# Patient Record
Sex: FEMALE | Race: OTHER | Employment: UNEMPLOYED | URBAN - METROPOLITAN AREA
[De-identification: names, ages, dates, MRNs, and addresses within clinical notes are randomized per-mention and may not be internally consistent; named-entity substitution may affect disease eponyms.]

---

## 2018-02-14 ENCOUNTER — OFFICE VISIT (OUTPATIENT)
Dept: FAMILY MEDICINE CLINIC | Age: 38
End: 2018-02-14

## 2018-02-14 VITALS
TEMPERATURE: 98 F | HEIGHT: 60 IN | HEART RATE: 80 BPM | BODY MASS INDEX: 49.87 KG/M2 | SYSTOLIC BLOOD PRESSURE: 122 MMHG | WEIGHT: 254 LBS | DIASTOLIC BLOOD PRESSURE: 67 MMHG

## 2018-02-14 DIAGNOSIS — R07.9 CHEST PAIN, UNSPECIFIED TYPE: ICD-10-CM

## 2018-02-14 DIAGNOSIS — Z23 ENCOUNTER FOR IMMUNIZATION: Primary | ICD-10-CM

## 2018-02-14 DIAGNOSIS — E78.5 HYPERLIPIDEMIA, UNSPECIFIED HYPERLIPIDEMIA TYPE: ICD-10-CM

## 2018-02-14 DIAGNOSIS — R06.00 NOCTURNAL DYSPNEA: ICD-10-CM

## 2018-02-14 NOTE — MR AVS SNAPSHOT
[facility] 
 
 
 222 [facility] 
[phone] Patient: Rebecca Plunkett MRN: [id number] AAR:9/90/6618 Visit Information Date & Time Provider Department Dept. Phone Encounter #  
 2/14/2018 10:45 AM Teddy Blanco  Formerly [facility], [address] [phone] [id number] Follow-up Instructions Return in about 3 months (around 5/14/2018), or if symptoms worsen or fail to improve, for CPE. Upcoming Health Maintenance Date Due DTaP/Tdap/Td series (1 - Tdap) 3/21/1999 PAP AKA CERVICAL CYTOLOGY 3/21/1999 Influenza Age 5 to Adult 8/1/2017 Allergies as of 2/14/2018  Review Complete On: 2/14/2018 By: Surjit Swift No Known Allergies Current Immunizations  Never Reviewed Name Date Influenza Vaccine (Quad) PF  Incomplete Tdap  Incomplete Not reviewed this visit You Were Diagnosed With   
  
 Codes Comments Encounter for immunization    -  Primary ICD-10-CM: H41 ICD-9-CM: V03.89 Chest pain, unspecified type     ICD-10-CM: R07.9 ICD-9-CM: 786.50 Hyperlipidemia, unspecified hyperlipidemia type     ICD-10-CM: E78.5 ICD-9-CM: 272.4 Nocturnal dyspnea     ICD-10-CM: R06.00 
ICD-9-CM: 786.02 Vitals BP Pulse Temp Height(growth percentile) Weight(growth percentile) BMI  
 122/67 (BP 1 Location: Right arm, BP Patient Position: Sitting) 80 98 °F (36.7 °C) (Oral) 5' (1.524 m) 254 lb (115.2 kg) 49.61 kg/m2 OB Status Smoking Status Having regular periods Never Smoker Vitals History BMI and BSA Data Body Mass Index Body Surface Area  
 49.61 kg/m 2 2.21 m 2 Preferred Pharmacy Pharmacy Name Phone [facility] [address] [place], [place] [address]Vance Konradhagen [address] [place] [address] 1500 [facility] [phone] Your Updated Medication List  
  
   
This list is accurate as of: 2/14/18 12:05 PM.  Always use your most recent med list.  
  
  
  
  
 FENOFIBRATE PO Take  by mouth. We Performed the Following AMB POC RAPID INFLUENZA TEST [51296 CPT(R)] INFLUENZA VIRUS VAC QUAD,SPLIT,PRESV FREE SYRINGE IM [id number] CPT(R)] REFERRAL TO CARDIOLOGY [OKF96 Custom] SLEEP MEDICINE REFERRAL [UBH620 Custom] Comments:  
 Orders: 
Sleep Medicine Consult - Schedule patient for a sleep specialist consult. If appropriate, schedule patient for sleep study(s). Initiate treatment if needed. Forward correspondance to my office. TETANUS, DIPHTHERIA TOXOIDS AND ACELLULAR PERTUSSIS VACCINE (TDAP), IN INDIVIDS. >=7, IM [id number] CPT(R)] Follow-up Instructions Return in about 3 months (around 5/14/2018), or if symptoms worsen or fail to improve, for CPE. Referral Information Referral ID Referred By Referred To  
  
 [id number] JEANNA, [address] [facility], [address] Phone: [phone] Fax: [phone] Visits Status Start Date End Date 1 New Request 2/14/18 2/14/19 If your referral has a status of pending review or denied, additional information will be sent to support the outcome of this decision. Referral ID Referred By Referred To  
 [id number] [facility]  
   [address] [facility] [address] [facility] Phone: [phone] Visits Status Start Date End Date 1 New Request 2/14/18 2/14/19 If your referral has a status of pending review or denied, additional information will be sent to support the outcome of this decision. Patient Instructions Chest Pain: Care Instructions Your Care Instructions There are many things that can cause chest pain. Some are not serious and will get better on their own in a few days. But some kinds of chest pain need more testing and treatment.  Your doctor may have recommended a follow-up visit in the next 8 to 12 hours. If you are not getting better, you may need more tests or treatment. Even though your doctor has released you, you still need to watch for any problems. The doctor carefully checked you, but sometimes problems can develop later. If you have new symptoms or if your symptoms do not get better, get medical care right away. If you have worse or different chest pain or pressure that lasts more than 5 minutes or you passed out (lost consciousness), call 911 or seek other emergency help right away. A medical visit is only one step in your treatment. Even if you feel better, you still need to do what your doctor recommends, such as going to all suggested follow-up appointments and taking medicines exactly as directed. This will help you recover and help prevent future problems. How can you care for yourself at home? · Rest until you feel better. · Take your medicine exactly as prescribed. Call your doctor if you think you are having a problem with your medicine. · Do not drive after taking a prescription pain medicine. When should you call for help? Call 911 if: 
? · You passed out (lost consciousness). ? · You have severe difficulty breathing. ? · You have symptoms of a heart attack. These may include: ¨ Chest pain or pressure, or a strange feeling in your chest. 
¨ Sweating. ¨ Shortness of breath. ¨ Nausea or vomiting. ¨ Pain, pressure, or a strange feeling in your back, neck, jaw, or upper belly or in one or both shoulders or arms. ¨ Lightheadedness or sudden weakness. ¨ A fast or irregular heartbeat. After you call 911, the  may tell you to chew 1 adult-strength or 2 to 4 low-dose aspirin. Wait for an ambulance. Do not try to drive yourself. ?Call your doctor today if: 
? · You have any trouble breathing. ? · Your chest pain gets worse. ? · You are dizzy or lightheaded, or you feel like you may faint. ? · You are not getting better as expected. ? · You are having new or different chest pain. Where can you learn more? Go to http://aleksey-michael.info/. Enter A120 in the search box to learn more about \"Chest Pain: Care Instructions. \" Current as of: March 20, 2017 Content Version: 11.4 © 9868-0303 MICMALI. Care instructions adapted under license by [employer] (which disclaims liability or warranty for this information). If you have questions about a medical condition or this instruction, always ask your healthcare professional. [address] any warranty or liability for your use of this information. Introducing [facility] & HEALTH SERVICES! Elin Zelaya introduces [employer] patient portal. Now you can access parts of your medical record, email your doctor's office, and request medication refills online. 1. In your internet browser, go to [email or web address] 2. Click on the First Time User? Click Here link in the Sign In box. You will see the New Member Sign Up page. 3. Enter your [employer] Access Code exactly as it appears below. You will not need to use this code after youve completed the sign-up process. If you do not sign up before the expiration date, you must request a new code. · [employer] Access Code: [email or web address] Expires: 5/15/2018 12:05 PM 
 
4. Enter the last four digits of your Social Security Number (xxxx) and Date of Birth (mm/dd/yyyy) as indicated and click Submit. You will be taken to the next sign-up page. 5. Create a Reppifyt ID. This will be your [employer] login ID and cannot be changed, so think of one that is secure and easy to remember. 6. Create a Reppifyt password. You can change your password at any time. 7. Enter your Password Reset Question and Answer. This can be used at a later time if you forget your password. 8. Enter your e-mail address.  You will receive e-mail notification when new information is available in Newsummitbio. 9. Click Sign Up. You can now view and download portions of your medical record. 10. Click the Download Summary menu link to download a portable copy of your medical information. If you have questions, please visit the Frequently Asked Questions section of the Newsummitbio website. Remember, Newsummitbio is NOT to be used for urgent needs. For medical emergencies, dial 911. Now available from your iPhone and Android! Please provide this summary of care documentation to your next provider. Your primary care clinician is listed as Abran Rubin. If you have any questions after today's visit, please call [phone].

## 2018-02-14 NOTE — PATIENT INSTRUCTIONS
Chest Pain: Care Instructions  Your Care Instructions    There are many things that can cause chest pain. Some are not serious and will get better on their own in a few days. But some kinds of chest pain need more testing and treatment. Your doctor may have recommended a follow-up visit in the next 8 to 12 hours. If you are not getting better, you may need more tests or treatment. Even though your doctor has released you, you still need to watch for any problems. The doctor carefully checked you, but sometimes problems can develop later. If you have new symptoms or if your symptoms do not get better, get medical care right away. If you have worse or different chest pain or pressure that lasts more than 5 minutes or you passed out (lost consciousness), call 911 or seek other emergency help right away. A medical visit is only one step in your treatment. Even if you feel better, you still need to do what your doctor recommends, such as going to all suggested follow-up appointments and taking medicines exactly as directed. This will help you recover and help prevent future problems. How can you care for yourself at home? · Rest until you feel better. · Take your medicine exactly as prescribed. Call your doctor if you think you are having a problem with your medicine. · Do not drive after taking a prescription pain medicine. When should you call for help? Call 911 if:  ? · You passed out (lost consciousness). ? · You have severe difficulty breathing. ? · You have symptoms of a heart attack. These may include:  ¨ Chest pain or pressure, or a strange feeling in your chest.  ¨ Sweating. ¨ Shortness of breath. ¨ Nausea or vomiting. ¨ Pain, pressure, or a strange feeling in your back, neck, jaw, or upper belly or in one or both shoulders or arms. ¨ Lightheadedness or sudden weakness. ¨ A fast or irregular heartbeat.   After you call 911, the  may tell you to chew 1 adult-strength or 2 to 4 low-dose aspirin. Wait for an ambulance. Do not try to drive yourself. ?Call your doctor today if:  ? · You have any trouble breathing. ? · Your chest pain gets worse. ? · You are dizzy or lightheaded, or you feel like you may faint. ? · You are not getting better as expected. ? · You are having new or different chest pain. Where can you learn more? Go to http://aleksey-michael.info/. Enter A120 in the search box to learn more about \"Chest Pain: Care Instructions. \"  Current as of: March 20, 2017  Content Version: 11.4  © 3409-3534 ReliOn. Care instructions adapted under license by [employer] (which disclaims liability or warranty for this information). If you have questions about a medical condition or this instruction, always ask your healthcare professional. [address] any warranty or liability for your use of this information.

## 2018-02-14 NOTE — PROGRESS NOTES
Assessment/Plan:     Diagnoses and all orders for this visit:    1. Encounter for immunization  -     TETANUS, DIPHTHERIA TOXOIDS AND ACELLULAR PERTUSSIS VACCINE (TDAP), IN INDIVIDS. >=7, IM  -     INFLUENZA VIRUS VACCINE QUADRIVALENT, PRESERVATIVE FREE SYRINGE (82746)    2. Chest pain, unspecified type  -     REFERRAL TO CARDIOLOGY  Atypical in nature. Will refer to cardiology for additional evaluation. Patient is poor historian so history is unclear. 3. Hyperlipidemia, unspecified hyperlipidemia type   -On Fenofibrate from previous provider. Will get records and schedule CPE with bloodwork to determine plan    4. Nocturnal dyspnea  -     SLEEP MEDICINE REFERRAL  Will evaluate for sleep apnea. The patient was seen in conjunction with the NP and plan of care was agreed upon by me. Yu Rodriguez, RIVAS    I have reviewed/discussed the above normal BMI with the patient. I have recommended the following interventions: dietary management education, guidance, and counseling, encourage exercise, monitor weight and prescribed dietary intake. Given written instructions as well. Follow-up Disposition:  Return in about 3 months (around 5/14/2018), or if symptoms worsen or fail to improve, for CPE. Discussed expected course/resolution/complications of diagnosis in detail with patient.    Medication risks/benefits/costs/interactions/alternatives discussed with patient.    Pt was given after visit summary which includes diagnoses, current medications & vitals. Pt expressed understanding with the diagnosis and plan        Subjective:      Zehra Leigh is a 42 y.o. female who presents for had concerns including Chest Pain and Referral Follow Up. Chest Pain  Patient complains of chest pain. Onset was 2 months ago, with worsening course since that time.   The patient admits to chest discomfort that is intermittent, with radiation to left arm, rated as a scale of 5/10 in intensity that is tightness in nature. Associated symptoms are chest pain, chest pressure/discomfort, palpitations, near-syncope, SOB, dizziness, PND. Aggravating factors are large meals, walking or exertion. Alleviating factors are: nothing. Patient's cardiac risk factors are family history, dyslipidemia, sedentary life style, hypertension. Previous cardiac testing includes: Electrocardiogram (EKG). Pt states 2-3 years ago had a heart attack. Went to [facility] ER. Having and EGD on 2/23 and wants Cardiology referral.           No Known Allergies    ROS:   Review of Systems   Constitutional: Negative for chills, fever and weight loss. Respiratory: Positive for shortness of breath. Negative for cough. Cardiovascular: Positive for chest pain, palpitations and PND. Negative for leg swelling. Gastrointestinal: Positive for nausea. Negative for abdominal pain. Neurological: Positive for dizziness. Objective:     Visit Vitals    /67 (BP 1 Location: Right arm, BP Patient Position: Sitting)    Pulse 80    Temp 98 °F (36.7 °C) (Oral)    Ht 5' (1.524 m)    Wt 254 lb (115.2 kg)    BMI 49.61 kg/m2       Vitals and Nurse Documentation reviewed. Physical Exam   Constitutional: She is well-developed, well-nourished, and in no distress. No distress. Cardiovascular: Normal rate, regular rhythm and normal heart sounds. Exam reveals no gallop and no friction rub. No murmur heard. Pulmonary/Chest: Effort normal and breath sounds normal. No respiratory distress. She has no wheezes. She has no rales. Musculoskeletal: She exhibits no edema. Skin: She is not diaphoretic.

## 2018-02-14 NOTE — PROGRESS NOTES
Chief Complaint   Patient presents with    Chest Pain     x1 week    Referral Follow Up       1. Have you been to the ER, urgent care clinic since your last visit? Hospitalized since your last visit? No    2. Have you seen or consulted any other health care providers outside of the [address] [place] [address] since your last visit? Include any pap smears or colon screening. 2/2/17 Victoriano Wright.

## 2018-02-21 PROBLEM — E66.01 OBESITY, MORBID (HCC): Status: ACTIVE | Noted: 2018-02-21

## 2018-02-22 ENCOUNTER — OFFICE VISIT (OUTPATIENT)
Dept: CARDIOLOGY CLINIC | Age: 38
End: 2018-02-22

## 2018-02-22 VITALS
OXYGEN SATURATION: 98 % | BODY MASS INDEX: 49.98 KG/M2 | DIASTOLIC BLOOD PRESSURE: 80 MMHG | WEIGHT: 254.6 LBS | HEART RATE: 84 BPM | SYSTOLIC BLOOD PRESSURE: 132 MMHG | HEIGHT: 60 IN | RESPIRATION RATE: 20 BRPM

## 2018-02-22 DIAGNOSIS — Z82.49 FAMILY HISTORY OF EARLY CAD: ICD-10-CM

## 2018-02-22 DIAGNOSIS — R07.9 CHEST PAIN, UNSPECIFIED TYPE: ICD-10-CM

## 2018-02-22 DIAGNOSIS — I20.0 UNSTABLE ANGINA (HCC): Primary | ICD-10-CM

## 2018-02-22 DIAGNOSIS — E66.01 OBESITY, MORBID (HCC): ICD-10-CM

## 2018-02-22 RX ORDER — FENOFIBRATE 134 MG/1
CAPSULE ORAL
COMMUNITY
End: 2018-07-30 | Stop reason: SDUPTHER

## 2018-02-22 NOTE — PROGRESS NOTES
[place]ER[place]: Elaine  [phone]    History of Present Illness:   Ms. Mary Gimenez is a 41 yo Farsi speaking female with morbid obesity, family history of early coronary artery disease, depression referred by Yu Rodriguez NP for cardiac evaluation. She is here due to recent chest pains. They have been occurring for the last few months a few times a week where she will get substernal chest pain that will sometimes radiate to her left arm, can last 20-30 minutes at a time, no clear triggers can happen with rest or exertion. She was a very poor historian, daughter is present to help translate. She apparently is going to have a GI EGD tomorrow. With regard to her prior cardiac history, she says she may have had an event with her heart two to three years ago when she went to [facility], but she is unclear about the details. She does not think she has had a stress test or heart catheterization. She is compensated on exam with clear lungs. Blood pressure was 132/80 with a heart rate of 84, but her EKG was normal sinus rhythm, heart rate of 73, nonspecific ST-T wave abnormality. Soc hx. No tobacco  Fam hx. Brother CAD, 36. Assessment and Plan:   1. Unstable angina. Chest pain with typical and atypical features and she does not have much in terms of risk factors, but does have significant family history. Will proceed with a treadmill stress echocardiogram for further evaluation. I think she can have the EGD first.  If she wants to delay the EGD after the stress test that is okay as well. I gave her a note that she can proceed with an EGD tomorrow if she would like. 2. Family history of early coronary artery disease. 3. Morbid obesity. She  has a past medical history of Depression and Dizziness. All other systems negative except as above.      PE  Vitals:    02/22/18 1033   BP: 132/80   Pulse: 84   Resp: 20   SpO2: 98%   Height: 5' (1.524 m)    There is no height or weight on file to calculate BMI. General appearance - alert, morbid obesity, and in no distress  Mental status - affect appropriate to mood  Eyes - sclera anicteric, moist mucous membranes  Neck - supple, no JVD  Chest - clear to auscultation, no wheezes, rales or rhonchi  Heart - normal rate, regular rhythm, normal S1, S2, no murmurs, rubs, clicks or gallops  Abdomen - soft, nontender, nondistended, no masses or organomegaly  Neurological - no focal deficit  Extremities - peripheral pulses normal, no pedal edema      Recent Labs:  Lab Results   Component Value Date/Time    Cholesterol, total 158 04/02/2015 09:49 AM    HDL Cholesterol 41 04/02/2015 09:49 AM    LDL, calculated 91 04/02/2015 09:49 AM    Triglyceride 131 04/02/2015 09:49 AM     Lab Results   Component Value Date/Time    Creatinine 0.59 04/02/2015 09:49 AM     Lab Results   Component Value Date/Time    BUN 12 04/02/2015 09:49 AM     Lab Results   Component Value Date/Time    Potassium 4.2 04/02/2015 09:49 AM     Lab Results   Component Value Date/Time    Hemoglobin A1c 5.6 04/02/2015 09:49 AM     Lab Results   Component Value Date/Time    HGB 12.9 04/02/2015 09:49 AM     Lab Results   Component Value Date/Time    PLATELET 935 78/14/3559 09:49 AM       Reviewed:  Past Medical History:   Diagnosis Date    Depression     Dizziness      History   Smoking Status    Never Smoker   Smokeless Tobacco    Former User     Comment: hookah      History   Alcohol Use No     No Known Allergies    Current Outpatient Prescriptions   Medication Sig    fenofibrate micronized (LOFIBRA) 134 mg capsule Take  by mouth every morning. No current facility-administered medications for this visit.         Rin Husain MD  [facility] heart and Vascular [place]  [address], [address] [facility] [address]  [address]

## 2018-02-22 NOTE — LETTER
2/22/2018 11:05 AM 
 
Ms. Annie Wheeler [address] [place] [id number] To Whom it may concern, 
 
Ms. Marquise Beaver is a patient of Dr. Silverio Gonzalez. She has been cleared from a cardiac standpoint to proceed with her upcoming EGD. Please contact the office with any questions. Sincerely, Maegan Garcia MD

## 2018-02-22 NOTE — MR AVS SNAPSHOT
[address] [facility] [address] [facility] 
[phone] Patient: Santi Renteria MRN: [id number] TKB:4/64/8503 Visit Information Date & Time Provider Department Dept. Phone Encounter #  
 2/22/2018 10:40 AM Rin Husain MD [facility] [place] [phone] [id number] Your Appointments 5/16/2018  9:00 AM  
SAME DAY with Dasha Jackson  [facility] ([facility]) Appt Note: f/u on chest pain [address] [facility] 404 [facility]  
  
   
 [address] [phone]  
  
    
 5/31/2018  3:00 PM  
Any with Aj Gibson MD  
[address] [place] [address] ([facility]) Appt Note: NP refd by Dr. RAMAN \[facility]\"" [place] nocturnal dyspnea [address] [facility] [address] [phone] Upcoming Health Maintenance Date Due  
 PAP AKA CERVICAL CYTOLOGY 3/21/1999 DTaP/Tdap/Td series (2 - Td) 2/14/2028 Allergies as of 2/22/2018  Review Complete On: 2/22/2018 By: Edilia Delgado No Known Allergies Current Immunizations  Never Reviewed Name Date Influenza Vaccine (Quad) PF 2/14/2018 Tdap 2/14/2018 Not reviewed this visit You Were Diagnosed With   
  
 Codes Comments Unstable angina (HCC)    -  Primary ICD-10-CM: I20.0 ICD-9-CM: 411.1 Chest pain, unspecified type     ICD-10-CM: R07.9 ICD-9-CM: 786.50 Obesity, morbid ([facility] [address]     ICD-10-CM: E66.01 
ICD-9-CM: 278.01 Family history of early CAD     ICD-10-CM: Z82.49 
ICD-9-CM: V17.3 Vitals BP Pulse Resp Height(growth percentile) Weight(growth percentile) SpO2  
 132/80 (BP 1 Location: Left arm) 84 20 5' (1.524 m) 254 lb 9.6 oz (115.5 kg) 98% BMI OB Status Smoking Status 49.72 kg/m2 Having regular periods Never Smoker Vitals History BMI and BSA Data Body Mass Index Body Surface Area 49.72 kg/m 2 2.21 m 2 Preferred Pharmacy Pharmacy Name Phone [facility] [address] [facility], [address] [place] [address],[facility] Floor [phone] Your Updated Medication List  
  
   
This list is accurate as of 2/22/18 11:07 AM.  Always use your most recent med list.  
  
  
  
  
 fenofibrate micronized 134 mg capsule Commonly known as:  LOFIBRA Take  by mouth every morning. We Performed the Following AMB POC EKG ROUTINE W/ 12 LEADS, INTER & REP [94912 CPT(R)] To-Do List   
 02/22/2018 ECHO:  ECHO TTE STRESS EXRCSE COMP W OR WO CONTR Introducing \[facility]\"" & HEALTH SERVICES! Christine Thomason introduces [employer] patient portal. Now you can access parts of your medical record, email your doctor's office, and request medication refills online. 1. In your internet browser, go to [email or web address] [employer] 2. Click on the First Time User? Click Here link in the Sign In box. You will see the New Member Sign Up page. 3. Enter your Universal [employer] Access Code exactly as it appears below. You will not need to use this code after youve completed the sign-up process. If you do not sign up before the expiration date, you must request a new code. · [employer] Access Code: [email or web address] Expires: 5/15/2018 12:05 PM 
 
4. Enter the last four digits of your Social Security Number (xxxx) and Date of Birth (mm/dd/yyyy) as indicated and click Submit. You will be taken to the next sign-up page. 5. Create a Universal Ad ID. This will be your Universal [employer] login ID and cannot be changed, so think of one that is secure and easy to remember. 6. Create a Universal Ad password. You can change your password at any time. 7. Enter your Password Reset Question and Answer. This can be used at a later time if you forget your password. 8. Enter your e-mail address.  You will receive e-mail notification when new information is available in GetPromotd. 9. Click Sign Up. You can now view and download portions of your medical record. 10. Click the Download Summary menu link to download a portable copy of your medical information. If you have questions, please visit the Frequently Asked Questions section of the GetPromotd website. Remember, GetPromotd is NOT to be used for urgent needs. For medical emergencies, dial 911. Now available from your iPhone and Android! Please provide this summary of care documentation to your next provider. Your primary care clinician is listed as Kaylan Alonso. If you have any questions after today's visit, please call [phone].

## 2018-02-28 ENCOUNTER — TELEPHONE (OUTPATIENT)
Dept: FAMILY MEDICINE CLINIC | Age: 38
End: 2018-02-28

## 2018-02-28 NOTE — TELEPHONE ENCOUNTER
Patients daughter, Livier Stephenson is calling, she would like to know if RIVAS Joan has received her mothers results from her stomach US that was done last Friday, she states that she was told by outpatient registration at [facility] that everything would be sent over as soon as possible. After checking the patients HIPPA form the daughter was not on it, I advised her to please update this, she advised that her mom only speaks [place] and a  was contacted via [employer] system (call ID #[id number]), spoke with the patient and she was advised we do not have the information she is requesting, she would like to know when we are going to have it, I advised her I am unsure we do have to wait for [facility] to send us the records but I will send this back for the provider to review. She would like a call back as soon as possible as she needs to know if she needs an endoscopy or not.      Best call back # for patient: [phone]  Patient needs a [employer]

## 2018-03-02 NOTE — TELEPHONE ENCOUNTER
Called patient. Interpretor was called because the patient speaks Farsi.(call ID: [id number]). Name and  verified. Inform patient of pcp's recommendations for US and give patient number to schedule an appointment with GI doctor. Daughter wanted to speak to nurse, but couldn't speak to the daughter due to daughter not being on HIPPA form.

## 2018-03-02 NOTE — TELEPHONE ENCOUNTER
Us shows fatty liver only, which does not cause bloating. She should follow up with gastroenterology.

## 2018-03-06 ENCOUNTER — CLINICAL SUPPORT (OUTPATIENT)
Dept: CARDIOLOGY CLINIC | Age: 38
End: 2018-03-06

## 2018-03-06 DIAGNOSIS — R07.9 CHEST PAIN, UNSPECIFIED TYPE: ICD-10-CM

## 2018-03-06 DIAGNOSIS — Z82.49 FAMILY HISTORY OF EARLY CAD: ICD-10-CM

## 2018-03-06 DIAGNOSIS — E66.01 OBESITY, MORBID (HCC): ICD-10-CM

## 2018-03-06 DIAGNOSIS — I20.0 UNSTABLE ANGINA (HCC): ICD-10-CM

## 2018-03-07 ENCOUNTER — TELEPHONE (OUTPATIENT)
Dept: CARDIOLOGY CLINIC | Age: 38
End: 2018-03-07

## 2018-03-07 NOTE — TELEPHONE ENCOUNTER
----- Message from Frank Hahn MD sent at 3/6/2018  3:55 PM EST -----  Please let pt know stress test was normal. thx    Results letter mailed to patient.

## 2018-03-07 NOTE — LETTER
3/7/2018 10:37 AM 
 
Ms. Annie Wheeler [address] [facility] [address] Dear Madeleine Osler I have reviewed your results and have found the results listed below to be within normal ranges. Stress Echocardiogram 
 
My recommendations are as follows: 
None. Keep up the good work! Please call if you have any questions [phone] . Sincerely, Cristal Gan MD

## 2018-05-16 ENCOUNTER — OFFICE VISIT (OUTPATIENT)
Dept: FAMILY MEDICINE CLINIC | Age: 38
End: 2018-05-16

## 2018-05-16 VITALS
HEART RATE: 80 BPM | RESPIRATION RATE: 18 BRPM | BODY MASS INDEX: 49.48 KG/M2 | DIASTOLIC BLOOD PRESSURE: 71 MMHG | OXYGEN SATURATION: 97 % | TEMPERATURE: 98.5 F | HEIGHT: 60 IN | WEIGHT: 252 LBS | SYSTOLIC BLOOD PRESSURE: 105 MMHG

## 2018-05-16 DIAGNOSIS — E66.01 MORBID OBESITY (HCC): ICD-10-CM

## 2018-05-16 DIAGNOSIS — K21.9 GASTROESOPHAGEAL REFLUX DISEASE WITHOUT ESOPHAGITIS: Primary | ICD-10-CM

## 2018-05-16 DIAGNOSIS — Z12.31 SCREENING MAMMOGRAM, ENCOUNTER FOR: ICD-10-CM

## 2018-05-16 DIAGNOSIS — R42 VERTIGO: ICD-10-CM

## 2018-05-16 DIAGNOSIS — Z13.220 SCREENING, LIPID: ICD-10-CM

## 2018-05-16 DIAGNOSIS — Z13.1 SCREENING FOR DIABETES MELLITUS: ICD-10-CM

## 2018-05-16 NOTE — PROGRESS NOTES
Chief Complaint   Patient presents with    Chest Pain     follow up    Leg Pain     Right and left     Cholesterol Problem     1. Have you been to the ER, urgent care clinic since your last visit? Hospitalized since your last visit? No    2. Have you seen or consulted any other health care providers outside of the [address] [place] [address] since your last visit? Include any pap smears or colon screening.  No

## 2018-05-16 NOTE — PROGRESS NOTES
Assessment/Plan:     Diagnoses and all orders for this visit:    1. Gastroesophageal reflux disease without esophagitis  Uncontrolled. She will continue PPI therapy. She will follow-up with Dr. Ewa Lu for EGD next month. I have strongly encouraged dietary modifications including the reduction of high fat foods. She does eat spicy foods often. We did discuss reduction in these. 2. Screening, lipid  -     LIPID PANEL    3. Screening mammogram, encounter for  -     [facility] MAMMO BI SCREENING INCL CAD; Future    4. Morbid obesity ([address]  -     HEMOGLOBIN A1C WITH EAG  I have recommended weight loss of 5 pounds this month. She will follow-up in 1 month. We did discuss increasing her raw fruits and vegetables as well as lean proteins including beans. She will reduce her fat content including mayonnaise, ranch dressing, and cooking oil. We discussed a goal of 1 tablespoon which is a significant reduction from her previously estimated one fourth of a cup for meal preparation. 5. Screening for diabetes mellitus  -     HEMOGLOBIN A1C WITH EAG    6. Vertigo  -     CBC WITH AUTOMATED DIFF  -     METABOLIC PANEL, COMPREHENSIVE  Unclear etiology. Labs as above. Continue to monitor. Other orders  -     CVD REPORT        Follow-up Disposition:  Return in about 4 weeks (around 6/13/2018) for Follow Up. Discussed expected course/resolution/complications of diagnosis in detail with patient.    Medication risks/benefits/costs/interactions/alternatives discussed with patient.    Pt was given after visit summary which includes diagnoses, current medications & vitals. Pt expressed understanding with the diagnosis and plan          Subjective:      Js Ramsey is a 34 y.o. female who presents for had concerns including Chest Pain; Leg Pain; and Constipation. The patient is accompanied by patient and daughter and translation is assisted with her daughter as the patient is Occitan-speaking only.      Presents with a multitude of concerns. Reports a history of constipation for greater than 5 months. Bowel movements are hard with pain associated and are every 2 days. She is not currently using over-the-counter medications. She has had prior gastroenterology evaluation. She has not made dietary modifications. She has upcoming EGD scheduled with Dr. Sukh Ramírez next month. She denies melena or tarry stool. She continues with indigestion. She is using daily PPI therapy. She would like to know if she should continue. She is interested in losing weight. She is currently not on a formal diet program.  She is not using medications for weight loss. This is her heaviest lifetime weight at 252 pounds. She admits to poor eating habits including a diet heavy in carbohydrates and saturated fat. 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 She does shop for the family. She does not drink juice or soda. She does not eat sweets. She is unable to exercise secondary to pain in the bilateral hips and knees. She has had no formal diet dentition evaluation. She is interested in fasting labs today. She has a history of hypertriglyceridemia. She is currently on [place] without difficulty. She is previously undergone cardiac evaluation for atypical chest pain with negative stress test this year. She continues with occasional chest pains not associated with shortness of breath. She is previously undergone Pap smear testing 1 year ago. She cannot recall the provider name. She reports \"something was removed\". She has never undergone mammogram screening. She reports a history of vertigo for greater than 5 months. She reports it is lightheadedness. Not associated with headache, nausea, vomiting, or visual changes. Reports that it is food associated typically including lemon and vinegar. Current Outpatient Prescriptions   Medication Sig Dispense Refill    pantoprazole (PROTONIX) 40 mg tablet Take 40 mg by mouth daily.  fenofibrate micronized (LOFIBRA) 134 mg capsule Take  by mouth every morning. No Known Allergies    ROS:   Complete review of systems was reviewed with pertinent information listed in HPI. Objective:     Visit Vitals    /71 (BP 1 Location: Left arm, BP Patient Position: Sitting)    Pulse 80    Temp 98.5 °F (36.9 °C) (Oral)    Resp 18    Ht 5' (1.524 m)    Wt 252 lb (114.3 kg)    SpO2 97%    BMI 49.22 kg/m2       Vitals and Nurse Documentation reviewed.      Physical Exam

## 2018-05-16 NOTE — MR AVS SNAPSHOT
[facility] 
 
 
 [address] 
[phone] Patient: Chante Domínguez MRN: [id number] FBT:[birth year] Visit Information Date & Time Provider Department Dept. Phone Encounter #  
 5/16/2018 11:30 AM Leonardo Arora  [facility] [phone] [id number] Follow-up Instructions Return in about 4 weeks (around 6/13/2018) for Follow Up. Your Appointments 5/31/2018  3:00 PM  
Any with Shoshana Renee MD  
[address] ([facility]) Appt Note: NP refd by Dr. Mode Franco nocturnal dyspnea [address] [address] Upcoming Health Maintenance Date Due  
 PAP AKA CERVICAL CYTOLOGY 3/21/1999 Influenza Age 5 to Adult 8/1/2018 DTaP/Tdap/Td series (2 - Td) 2/14/2028 Allergies as of 5/16/2018  Review Complete On: 5/16/2018 By: Charisma Webb LPN No Known Allergies Current Immunizations  Never Reviewed Name Date Influenza Vaccine (Quad) PF 2/14/2018 Tdap 2/14/2018 Not reviewed this visit You Were Diagnosed With   
  
 Codes Comments Screening, lipid    -  Primary ICD-10-CM: G89.309 ICD-9-CM: V77.91 Screening mammogram, encounter for     ICD-10-CM: Z12.31 
ICD-9-CM: V76.12 Morbid obesity ([facility]     ICD-10-CM: E66.01 
ICD-9-CM: 278.01 Screening for diabetes mellitus     ICD-10-CM: Z13.1 ICD-9-CM: V77.1 Vertigo     ICD-10-CM: J76 ICD-9-CM: 780.4 Vitals BP Pulse Temp Resp Height(growth percentile) Weight(growth percentile) 105/71 (BP 1 Location: Left arm, BP Patient Position: Sitting) 80 98.5 °F (36.9 °C) (Oral) 18 5' (1.524 m) 252 lb (114.3 kg) SpO2 BMI OB Status Smoking Status 97% 49.22 kg/m2 Having regular periods Never Smoker Vitals History BMI and BSA Data Body Mass Index Body Surface Area  
 49.22 kg/m 2 2.2 m 2 Preferred Pharmacy Pharmacy Name Phone [facility] [address] [facility], [address] [place] [address],[facility] Floor [phone] Your Updated Medication List  
  
   
This list is accurate as of 5/16/18 12:45 PM.  Always use your most recent med list.  
  
  
  
  
 fenofibrate micronized 134 mg capsule Commonly known as:  LOFIBRA Take  by mouth every morning. We Performed the Following CBC WITH AUTOMATED DIFF [56233 CPT(R)] HEMOGLOBIN A1C WITH EAG [80330 CPT(R)] LIPID PANEL [10329 CPT(R)] METABOLIC PANEL, COMPREHENSIVE [52886 CPT(R)] Follow-up Instructions Return in about 4 weeks (around 6/13/2018) for Follow Up. Introducing [facility] & HEALTH SERVICES! Mary Ellen Vera introduces [employer] patient portal. Now you can access parts of your medical record, email your doctor's office, and request medication refills online. 1. In your internet browser, go to [email or web address] 2. Click on the First Time User? Click Here link in the Sign In box. You will see the New Member Sign Up page. 3. Enter your [employer] Access Code exactly as it appears below. You will not need to use this code after youve completed the sign-up process. If you do not sign up before the expiration date, you must request a new code. · [employer] Access Code: [email or web address] Expires: 8/14/2018 11:30 AM 
 
4. Enter the last four digits of your Social Security Number (xxxx) and Date of Birth (mm/dd/yyyy) as indicated and click Submit. You will be taken to the next sign-up page. 5. Create a [employer] ID. This will be your [employer] login ID and cannot be changed, so think of one that is secure and easy to remember. 6. Create a [employer] password. You can change your password at any time. 7. Enter your Password Reset Question and Answer. This can be used at a later time if you forget your password. 8. Enter your e-mail address. You will receive e-mail notification when new information is available in [address] 9. Click Sign Up. You can now view and download portions of your medical record. 10. Click the Download Summary menu link to download a portable copy of your medical information. If you have questions, please visit the Frequently Asked Questions section of the Mobile [employer] website. Remember, [employer] is NOT to be used for urgent needs. For medical emergencies, dial 911. Now available from your iPhone and Android! Please provide this summary of care documentation to your next provider. Your primary care clinician is listed as Pauline Ambriz. If you have any questions after today's visit, please call [phone].

## 2018-05-17 LAB
ALBUMIN SERPL-MCNC: 4.2 G/DL (ref 3.5–5.5)
ALBUMIN/GLOB SERPL: 1.7 {RATIO} (ref 1.2–2.2)
ALP SERPL-CCNC: 66 IU/L (ref 39–117)
ALT SERPL-CCNC: 16 IU/L (ref 0–32)
AST SERPL-CCNC: 10 IU/L (ref 0–40)
BASOPHILS # BLD AUTO: 0 X10E3/UL (ref 0–0.2)
BASOPHILS NFR BLD AUTO: 0 %
BILIRUB SERPL-MCNC: 0.3 MG/DL (ref 0–1.2)
BUN SERPL-MCNC: 15 MG/DL (ref 6–24)
BUN/CREAT SERPL: 21 (ref 9–23)
CALCIUM SERPL-MCNC: 9.2 MG/DL (ref 8.7–10.2)
CHLORIDE SERPL-SCNC: 106 MMOL/L (ref 96–106)
CHOLEST SERPL-MCNC: 174 MG/DL (ref 100–199)
CO2 SERPL-SCNC: 22 MMOL/L (ref 18–29)
CREAT SERPL-MCNC: 0.7 MG/DL (ref 0.57–1)
EOSINOPHIL # BLD AUTO: 0.1 X10E3/UL (ref 0–0.4)
EOSINOPHIL NFR BLD AUTO: 1 %
ERYTHROCYTE [DISTWIDTH] IN BLOOD BY AUTOMATED COUNT: 13.8 % (ref 12.3–15.4)
EST. AVERAGE GLUCOSE BLD GHB EST-MCNC: 105 MG/DL
GFR SERPLBLD CREATININE-BSD FMLA CKD-EPI: 109 ML/MIN/1.73
GFR SERPLBLD CREATININE-BSD FMLA CKD-EPI: 125 ML/MIN/1.73
GLOBULIN SER CALC-MCNC: 2.5 G/DL (ref 1.5–4.5)
GLUCOSE SERPL-MCNC: 95 MG/DL (ref 65–99)
HBA1C MFR BLD: 5.3 % (ref 4.8–5.6)
HCT VFR BLD AUTO: 37.2 % (ref 34–46.6)
HDLC SERPL-MCNC: 41 MG/DL
HGB BLD-MCNC: 12.1 G/DL (ref 11.1–15.9)
IMM GRANULOCYTES # BLD: 0 X10E3/UL (ref 0–0.1)
IMM GRANULOCYTES NFR BLD: 0 %
INTERPRETATION, 910389: NORMAL
LDLC SERPL CALC-MCNC: 107 MG/DL (ref 0–99)
LYMPHOCYTES # BLD AUTO: 3.4 X10E3/UL (ref 0.7–3.1)
LYMPHOCYTES NFR BLD AUTO: 45 %
MCH RBC QN AUTO: 26.9 PG (ref 26.6–33)
MCHC RBC AUTO-ENTMCNC: 32.5 G/DL (ref 31.5–35.7)
MCV RBC AUTO: 83 FL (ref 79–97)
MONOCYTES # BLD AUTO: 0.6 X10E3/UL (ref 0.1–0.9)
MONOCYTES NFR BLD AUTO: 8 %
NEUTROPHILS # BLD AUTO: 3.5 X10E3/UL (ref 1.4–7)
NEUTROPHILS NFR BLD AUTO: 46 %
PLATELET # BLD AUTO: 248 X10E3/UL (ref 150–379)
POTASSIUM SERPL-SCNC: 4.8 MMOL/L (ref 3.5–5.2)
PROT SERPL-MCNC: 6.7 G/DL (ref 6–8.5)
RBC # BLD AUTO: 4.5 X10E6/UL (ref 3.77–5.28)
SODIUM SERPL-SCNC: 143 MMOL/L (ref 134–144)
TRIGL SERPL-MCNC: 131 MG/DL (ref 0–149)
VLDLC SERPL CALC-MCNC: 26 MG/DL (ref 5–40)
WBC # BLD AUTO: 7.5 X10E3/UL (ref 3.4–10.8)

## 2018-05-17 RX ORDER — PANTOPRAZOLE SODIUM 40 MG/1
40 TABLET, DELAYED RELEASE ORAL DAILY
COMMUNITY
End: 2020-05-26

## 2018-06-06 ENCOUNTER — OFFICE VISIT (OUTPATIENT)
Dept: FAMILY MEDICINE CLINIC | Age: 38
End: 2018-06-06

## 2018-06-06 VITALS
HEIGHT: 60 IN | WEIGHT: 250 LBS | SYSTOLIC BLOOD PRESSURE: 115 MMHG | DIASTOLIC BLOOD PRESSURE: 68 MMHG | TEMPERATURE: 98 F | RESPIRATION RATE: 18 BRPM | OXYGEN SATURATION: 99 % | HEART RATE: 78 BPM | BODY MASS INDEX: 49.08 KG/M2

## 2018-06-06 DIAGNOSIS — K62.5 RECTAL BLEEDING: ICD-10-CM

## 2018-06-06 DIAGNOSIS — K59.04 CHRONIC IDIOPATHIC CONSTIPATION: Primary | ICD-10-CM

## 2018-06-06 DIAGNOSIS — E66.01 OBESITY, MORBID (HCC): ICD-10-CM

## 2018-06-06 DIAGNOSIS — Z12.4 PAP SMEAR FOR CERVICAL CANCER SCREENING: ICD-10-CM

## 2018-06-06 DIAGNOSIS — K21.9 GASTROESOPHAGEAL REFLUX DISEASE WITHOUT ESOPHAGITIS: ICD-10-CM

## 2018-06-06 RX ORDER — POLYETHYLENE GLYCOL 3350 17 G/17G
17 POWDER, FOR SOLUTION ORAL DAILY
Qty: 30 PACKET | Refills: 6 | Status: SHIPPED | OUTPATIENT
Start: 2018-06-06 | End: 2020-05-26

## 2018-06-06 RX ORDER — POLYETHYLENE GLYCOL 3350 17 G/17G
17 POWDER, FOR SOLUTION ORAL DAILY
COMMUNITY
End: 2018-06-06 | Stop reason: SDUPTHER

## 2018-06-06 NOTE — PROGRESS NOTES
Chief Complaint   Patient presents with    GERD     one month follow up, Leslie would like a     Cold Symptoms     cough, slight sore thorat     1. Have you been to the ER, urgent care clinic since your last visit? Hospitalized since your last visit? No    2. Have you seen or consulted any other health care providers outside of the [address] [place] [address] since your last visit? Include any pap smears or colon screening. No  Patient would like a prescription for a glucometer.

## 2018-06-06 NOTE — MR AVS SNAPSHOT
[address] [place] [place] [address] 
[phone] Patient: Cliff Doherty MRN: [id number] FKO:[birth year]269 Visit Information Date & Time Provider Department Dept. Phone Encounter #  
 6/6/2018 11:30 AM Gregory Garcia  [facility] [phone] [id number] Follow-up Instructions Return in about 4 weeks (around 7/4/2018) for Follow Up. Upcoming Health Maintenance Date Due  
 PAP AKA CERVICAL CYTOLOGY 3/21/1999 Influenza Age 5 to Adult 8/1/2018 DTaP/Tdap/Td series (2 - Td) 2/14/2028 Allergies as of 6/6/2018  Review Complete On: 6/6/2018 By: Gregory Garcia NP No Known Allergies Current Immunizations  Never Reviewed Name Date Influenza Vaccine (Quad) PF 2/14/2018 Tdap 2/14/2018 Not reviewed this visit You Were Diagnosed With   
  
 Codes Comments Chronic idiopathic constipation    -  Primary ICD-10-CM: K59.04 
ICD-9-CM: 564.00 Obesity, morbid ([facility]     ICD-10-CM: E66.01 
ICD-9-CM: 278.01 Gastroesophageal reflux disease without esophagitis     ICD-10-CM: K21.9 ICD-9-CM: 530.81 Pap smear for cervical cancer screening     ICD-10-CM: Z12.4 ICD-9-CM: V76.2 Vitals BP Pulse Temp Resp Height(growth percentile) Weight(growth percentile)  
 115/68 (BP 1 Location: Right arm, BP Patient Position: Sitting) 78 98 °F (36.7 °C) 18 5' (1.524 m) 250 lb (113.4 kg) SpO2 BMI OB Status Smoking Status 99% 48.82 kg/m2 Having regular periods Never Smoker BMI and BSA Data Body Mass Index Body Surface Area  
 48.82 kg/m 2 2.19 m 2 Preferred Pharmacy Pharmacy Name Phone [facility] [address] [facility], [address] [facility] Floor [phone] Your Updated Medication List  
  
   
This list is accurate as of 6/6/18 12:08 PM.  Always use your most recent med list.  
  
  
  
  
 fenofibrate micronized 134 mg capsule Commonly known as:  LOFIBRA Take  by mouth every morning. polyethylene glycol 17 gram packet Commonly known as:  Orysia [place] Take 1 Packet by mouth daily. PROTONIX 40 mg tablet Generic drug:  pantoprazole Take 40 mg by mouth daily. Prescriptions Sent to Pharmacy Refills  
 polyethylene glycol (MIRALAX) 17 gram packet 6 Sig: Take 1 Packet by mouth daily. Class: Normal  
 Pharmacy: [address] [place] [address], [address] [place] [address],[address]  #: [phone] Route: Oral  
  
Follow-up Instructions Return in about 4 weeks (around 7/4/2018) for Follow Up. To-Do List   
 06/06/2018 Pathology:  PAP IG, APTIMA HPV AND RFX 16/18,45 ([id number]) Patient Instructions Learning About Low-Carbohydrate Diets for Weight Loss What is a low-carbohydrate diet? Low-carb diets avoid foods that are high in carbohydrate. These high-carb foods include pasta, bread, rice, cereal, fruits, and starchy vegetables. Instead, these diets usually have you eat foods that are high in fat and protein. Many people lose weight quickly on a low-carb diet. But the early weight loss is water. People on this diet often gain the weight back after they start eating carbs again. Not all diet plans are safe or work well. A lot of the evidence shows that low-carb diets aren't healthy. That's because these diets often don't include healthy foods like fruits and vegetables. Losing weight safely means balancing protein, fat, and carbs with every meal and snack. And low-carb diets don't always provide the vitamins, minerals, and fiber you need. If you have a serious medical condition, talk to your doctor before you try any diet. These conditions include kidney disease, heart disease, type 2 diabetes, high cholesterol, and high blood pressure. If you are pregnant, it may not be safe for your baby if you are on a low-carb diet. How can you lose weight safely? You might have heard that a diet plan helped another person lose weight. But that doesn't mean that it will work for you. It is very hard to stay on a diet that includes lots of big changes in your eating habits. If you want to get to a healthy weight and stay there, making healthy lifestyle changes will often work better than dieting. These steps can help. · Make a plan for change. Work with your doctor to create a plan that is right for you. · See a dietitian. He or she can show you how to make healthy changes in your eating habits. · Manage stress. If you have a lot of stress in your life, it can be hard to focus on making healthy changes to your daily habits. · Track your food and activity. You are likely to do better at losing weight if you keep track of what you eat and what you do. Follow-up care is a key part of your treatment and safety. Be sure to make and go to all appointments, and call your doctor if you are having problems. It's also a good idea to know your test results and keep a list of the medicines you take. Where can you learn more? Go to http://aleksey-michael.info/. Enter A121 in the search box to learn more about \"Learning About Low-Carbohydrate Diets for Weight Loss. \" Current as of: May 12, 2017 Content Version: 11.4 © 9763-9442 Healthwise, Incorporated. Care instructions adapted under license by [employer] (which disclaims liability or warranty for this information). If you have questions about a medical condition or this instruction, always ask your healthcare professional. [place] [address] any warranty or liability for your use of this information. Introducing hospitals & HEALTH SERVICES! [facility] introduces [email or web address] patient portal. Now you can access parts of your medical record, email your doctor's office, and request medication refills online. 1. In your internet browser, go to [email or web address] 2. Click on the First Time User? Click Here link in the Sign In box. You will see the New Member Sign Up page. 3. Enter your [employer] Access Code exactly as it appears below. You will not need to use this code after youve completed the sign-up process. If you do not sign up before the expiration date, you must request a new code. · [employer]ive Access Code: [email or web address] Expires: 8/14/2018 11:30 AM 
 
4. Enter the last four digits of your Social Security Number (xxxx) and Date of Birth (mm/dd/yyyy) as indicated and click Submit. You will be taken to the next sign-up page. 5. Create a Relive ID. This will be your Relive login ID and cannot be changed, so think of one that is secure and easy to remember. 6. Create a Relive password. You can change your password at any time. 7. Enter your Password Reset Question and Answer. This can be used at a later time if you forget your password. 8. Enter your e-mail address. You will receive e-mail notification when new information is available in [address] 9. Click Sign Up. You can now view and download portions of your medical record. 10. Click the Download Summary menu link to download a portable copy of your medical information. If you have questions, please visit the Frequently Asked Questions section of the Relive website. Remember, Relive is NOT to be used for urgent needs. For medical emergencies, dial 911. Now available from your iPhone and Android! Please provide this summary of care documentation to your next provider. Your primary care clinician is listed as Karly Sanchez. If you have any questions after today's visit, please call [phone].

## 2018-06-06 NOTE — LETTER
6/19/2018 2:30 PM 
 
Ms. Annie Wheeler [address] [facility] [address] Dear Chante Domínguez: 
 
Please find your most recent results below. We have tried to call you twice, but we were not able reach you. A message from Jaden Brooke said that your phone has \"call restrisctions\". Resulted Orders PAP IG, APTIMA HPV AND RFX 16/18,45 ([id number]) Result Value Ref Range Diagnosis Comment Comment:  
   NEGATIVE FOR INTRAEPITHELIAL LESION AND MALIGNANCY. Specimen adequacy Comment Comment:  
   Satisfactory for evaluation. Endocervical and/or squamous metaplastic 
cells (endocervical component) are present. Performed by: Comment Comment:  
   Kaur Devlin, Cytotechnologist (ASCP) Damari Kelley Note: Comment Comment: The Pap smear is a screening test designed to aid in the detection of 
premalignant and malignant conditions of the uterine cervix. It is not a 
diagnostic procedure and should not be used as the sole means of detecting 
cervical cancer. Both false-positive and false-negative reports do occur. Test methodology Comment Comment: This liquid based ThinPrep(R) pap test was screened with the 
use of an image guided system. HPV APTIMA Negative Negative Comment: This test detects fourteen high-risk HPV types (16/18/31/33/35/39/45/ 
51/52/56/58/59/66/68) without differentiation. Narrative Specimen Comment: Source. ........... Damari Kelley Endocervix Specimen Comment: No. of containers. Damari Kelley 01 ThinPrep Vial 
Performed at:  [facility] [address]  [id number] : Camron Chu MD, Phone:  [phone] Performed at:  [address] 
[facility], [place], [place]  [id number] : Camron Chu MD, Phone:  [phone] RECOMMENDATIONS: 
 
Pap is normal 
 
Please call me if you have any questions: [phone] Sincerely, Leonardo Arora NP

## 2018-06-06 NOTE — PATIENT INSTRUCTIONS
Learning About Low-Carbohydrate Diets for Weight Loss  What is a low-carbohydrate diet? Low-carb diets avoid foods that are high in carbohydrate. These high-carb foods include pasta, bread, rice, cereal, fruits, and starchy vegetables. Instead, these diets usually have you eat foods that are high in fat and protein. Many people lose weight quickly on a low-carb diet. But the early weight loss is water. People on this diet often gain the weight back after they start eating carbs again. Not all diet plans are safe or work well. A lot of the evidence shows that low-carb diets aren't healthy. That's because these diets often don't include healthy foods like fruits and vegetables. Losing weight safely means balancing protein, fat, and carbs with every meal and snack. And low-carb diets don't always provide the vitamins, minerals, and fiber you need. If you have a serious medical condition, talk to your doctor before you try any diet. These conditions include kidney disease, heart disease, type 2 diabetes, high cholesterol, and high blood pressure. If you are pregnant, it may not be safe for your baby if you are on a low-carb diet. How can you lose weight safely? You might have heard that a diet plan helped another person lose weight. But that doesn't mean that it will work for you. It is very hard to stay on a diet that includes lots of big changes in your eating habits. If you want to get to a healthy weight and stay there, making healthy lifestyle changes will often work better than dieting. These steps can help. · Make a plan for change. Work with your doctor to create a plan that is right for you. · See a dietitian. He or she can show you how to make healthy changes in your eating habits. · Manage stress. If you have a lot of stress in your life, it can be hard to focus on making healthy changes to your daily habits. · Track your food and activity.  You are likely to do better at losing weight if you keep track of what you eat and what you do. Follow-up care is a key part of your treatment and safety. Be sure to make and go to all appointments, and call your doctor if you are having problems. It's also a good idea to know your test results and keep a list of the medicines you take. Where can you learn more? Go to http://aleksey-michael.info/. Enter A121 in the search box to learn more about \"Learning About Low-Carbohydrate Diets for Weight Loss. \"  Current as of: May 12, 2017  Content Version: 11.4  © 4640-7931 Healthwise, APROOFED. Care instructions adapted under license by [employer] (which disclaims liability or warranty for this information). If you have questions about a medical condition or this instruction, always ask your healthcare professional. [address] any warranty or liability for your use of this information.

## 2018-06-07 NOTE — PROGRESS NOTES
Assessment/Plan:     Diagnoses and all orders for this visit:    1. Chronic idiopathic constipation  -     polyethylene glycol (MIRALAX) 17 gram packet; Take 1 Packet by mouth daily. Stable. Refilled today. Continue current therapy. 2. Obesity, morbid ([address]  She has made some progress but is slow to make changes to our two main goals: reduction of fats and reduction of carbohydrates. I have continued to encourage her in these efforts. Referred to dietitian as well. 3. Gastroesophageal reflux disease without esophagitis  Continue follow up with GI as directed. EGD pending. Continue PPI therapy at this time. Encouraged weight loss. Encouraged reduction of spicy foods. 4. Pap smear for cervical cancer screening  -     PAP IG, APTIMA HPV AND RFX 16/18,45 ([id number]); Future  If normal, repeat in 5 years. 5. Rectal bleeding  -     OCCULT BLOOD, IMMUNOASSAY (FIT)  Likely hemorrhoidal bleeding but has resolved. FIT pending. Follow-up Disposition:  Return in about 4 weeks (around 7/4/2018) for Follow Up. Discussed expected course/resolution/complications of diagnosis in detail with patient.    Medication risks/benefits/costs/interactions/alternatives discussed with patient.    Pt was given after visit summary which includes diagnoses, current medications & vitals. Pt expressed understanding with the diagnosis and plan          Subjective:      Js Ramsey is a 34 y.o. female who presents for had concerns including GERD and Cold Symptoms. The patient is accompanied by daughter and translation is assisted with Ignyta as the patient is Yakut-speaking only. Continues with dietary improvements. Has cut back on bread, however continues with fried foods and other carbohydrates. Has not made changes to fat consumption including oils and dressings. Eating 3 meals a day with snacking. Weight loss is 2 pounds since last office visit. Has not attempted meeting the dietitian. Reports a history of bright red rectal bleeding with bowel movement 5 months ago. No recurrence since that time. Reports a history of rectal surgery 15 years ago although cannot recall specifics. Currently taking Miralax daily for constipation and reports daily bowel movements are soft without melena or tarry stool. Interested in pap smear today. Cannot recall last pap smear. Continues with GERD symptoms including esophageal burning. Has EGD scheduled for the end of the month. Continues with daily PPI therapy with breakthrough symptoms. Current Outpatient Prescriptions   Medication Sig Dispense Refill    polyethylene glycol (MIRALAX) 17 gram packet Take 1 Packet by mouth daily. 30 Packet 6    pantoprazole (PROTONIX) 40 mg tablet Take 40 mg by mouth daily.  fenofibrate micronized (LOFIBRA) 134 mg capsule Take  by mouth every morning. No Known Allergies    ROS:   Complete review of systems was reviewed with pertinent information listed in HPI. Objective:     Visit Vitals    /68 (BP 1 Location: Right arm, BP Patient Position: Sitting)    Pulse 78    Temp 98 °F (36.7 °C)    Resp 18    Ht 5' (1.524 m)    Wt 250 lb (113.4 kg)    SpO2 99%    BMI 48.82 kg/m2       Vitals and Nurse Documentation reviewed. Physical Exam   Constitutional: No distress. Morbidly obese. Cardiovascular: S1 normal and S2 normal.  Exam reveals no gallop and no friction rub. No murmur heard. Pulmonary/Chest: Breath sounds normal. No respiratory distress. Right breast exhibits no inverted nipple, no mass, no nipple discharge, no skin change and no tenderness. Left breast exhibits no inverted nipple, no mass, no nipple discharge, no skin change and no tenderness. Breasts are symmetrical.   Genitourinary: Rectal exam shows external hemorrhoid. Uterus is not enlarged, not fixed and not tender. Cervix is not fixed. Cervix exhibits no lesion and no tenderness.  Right adnexum displays no mass and no tenderness. Left adnexum displays no mass and no tenderness. Vulva exhibits no lesion and no rash. Vagina exhibits normal mucosa, no exudate and no lesion. No vaginal discharge found. Genitourinary Comments: Pap Smear obtained and tolerated without difficulty. Chaperone present. Lymphadenopathy:     She has no axillary adenopathy. Right: No inguinal adenopathy present. Left: No inguinal adenopathy present. Skin: Skin is warm and dry.    Psychiatric: Mood and affect normal.

## 2018-06-11 LAB
CYTOLOGIST CVX/VAG CYTO: NORMAL
CYTOLOGY CVX/VAG DOC THIN PREP: NORMAL
HPV I/H RISK 4 DNA CVX QL PROBE+SIG AMP: NEGATIVE
Lab: NORMAL
OTHER STN SPEC: NORMAL
PATH REPORT.FINAL DX SPEC: NORMAL
STAT OF ADQ CVX/VAG CYTO-IMP: NORMAL

## 2018-06-13 NOTE — PROGRESS NOTES
Tried contacting pt, unsuccessful, message during call stated \"Welcome to [employer], the number you have dialed has calling restrictions that have prevented the completion of your call\". Will try contacting pt again later, if still unsuccessful, will mail letter with normal results.

## 2018-06-19 NOTE — PROGRESS NOTES
Called patient a second time with no success. A [employer] message said that patient has some \"calling restrictions\". Will print lab results and mail to patient.

## 2018-06-25 ENCOUNTER — ANESTHESIA (OUTPATIENT)
Dept: ENDOSCOPY | Age: 38
End: 2018-06-25
Payer: MEDICAID

## 2018-06-25 ENCOUNTER — HOSPITAL ENCOUNTER (OUTPATIENT)
Age: 38
Setting detail: OUTPATIENT SURGERY
Discharge: HOME | End: 2018-06-25
Attending: INTERNAL MEDICINE | Admitting: INTERNAL MEDICINE
Payer: MEDICAID

## 2018-06-25 ENCOUNTER — ANESTHESIA EVENT (OUTPATIENT)
Dept: ENDOSCOPY | Age: 38
End: 2018-06-25
Payer: MEDICAID

## 2018-06-25 VITALS
DIASTOLIC BLOOD PRESSURE: 70 MMHG | BODY MASS INDEX: 49.08 KG/M2 | HEIGHT: 60 IN | WEIGHT: 250 LBS | SYSTOLIC BLOOD PRESSURE: 122 MMHG | RESPIRATION RATE: 18 BRPM | TEMPERATURE: 98.5 F | OXYGEN SATURATION: 99 % | HEART RATE: 74 BPM

## 2018-06-25 LAB
H PYLORI FROM TISSUE: POSITIVE
HCG UR QL: NEGATIVE
KIT LOT NO., HCLOLOT: NORMAL
NEGATIVE CONTROL: NEGATIVE
POSITIVE CONTROL: POSITIVE

## 2018-06-25 PROCEDURE — 87077 CULTURE AEROBIC IDENTIFY: CPT | Performed by: INTERNAL MEDICINE

## 2018-06-25 PROCEDURE — 74011250636 HC RX REV CODE- 250/636

## 2018-06-25 PROCEDURE — 74011000250 HC RX REV CODE- 250

## 2018-06-25 PROCEDURE — 81025 URINE PREGNANCY TEST: CPT

## 2018-06-25 PROCEDURE — 77030009426 HC FCPS BIOP ENDOSC BSC -B: Performed by: INTERNAL MEDICINE

## 2018-06-25 PROCEDURE — 76040000019: Performed by: INTERNAL MEDICINE

## 2018-06-25 PROCEDURE — 76060000031 HC ANESTHESIA FIRST 0.5 HR: Performed by: INTERNAL MEDICINE

## 2018-06-25 RX ORDER — ATROPINE SULFATE 0.1 MG/ML
0.5 INJECTION INTRAVENOUS
Status: CANCELLED | OUTPATIENT
Start: 2018-06-25 | End: 2018-06-26

## 2018-06-25 RX ORDER — FLUMAZENIL 0.1 MG/ML
0.2 INJECTION INTRAVENOUS
Status: CANCELLED | OUTPATIENT
Start: 2018-06-25 | End: 2018-06-25

## 2018-06-25 RX ORDER — DEXTROMETHORPHAN/PSEUDOEPHED 2.5-7.5/.8
1.2 DROPS ORAL
Status: CANCELLED | OUTPATIENT
Start: 2018-06-25

## 2018-06-25 RX ORDER — PROPOFOL 10 MG/ML
INJECTION, EMULSION INTRAVENOUS AS NEEDED
Status: DISCONTINUED | OUTPATIENT
Start: 2018-06-25 | End: 2018-06-25 | Stop reason: HOSPADM

## 2018-06-25 RX ORDER — SODIUM CHLORIDE 9 MG/ML
INJECTION, SOLUTION INTRAVENOUS
Status: DISCONTINUED | OUTPATIENT
Start: 2018-06-25 | End: 2018-06-25 | Stop reason: HOSPADM

## 2018-06-25 RX ORDER — SODIUM CHLORIDE 0.9 % (FLUSH) 0.9 %
5-10 SYRINGE (ML) INJECTION EVERY 8 HOURS
Status: CANCELLED | OUTPATIENT
Start: 2018-06-25 | End: 2018-06-25

## 2018-06-25 RX ORDER — SODIUM CHLORIDE 9 MG/ML
50 INJECTION, SOLUTION INTRAVENOUS CONTINUOUS
Status: CANCELLED | OUTPATIENT
Start: 2018-06-25 | End: 2018-06-25

## 2018-06-25 RX ORDER — FENTANYL CITRATE 50 UG/ML
100 INJECTION, SOLUTION INTRAMUSCULAR; INTRAVENOUS
Status: CANCELLED | OUTPATIENT
Start: 2018-06-25

## 2018-06-25 RX ORDER — EPINEPHRINE 0.1 MG/ML
1 INJECTION INTRACARDIAC; INTRAVENOUS
Status: CANCELLED | OUTPATIENT
Start: 2018-06-25 | End: 2018-06-26

## 2018-06-25 RX ORDER — LIDOCAINE HYDROCHLORIDE 20 MG/ML
INJECTION, SOLUTION EPIDURAL; INFILTRATION; INTRACAUDAL; PERINEURAL AS NEEDED
Status: DISCONTINUED | OUTPATIENT
Start: 2018-06-25 | End: 2018-06-25 | Stop reason: HOSPADM

## 2018-06-25 RX ORDER — NALOXONE HYDROCHLORIDE 0.4 MG/ML
0.4 INJECTION, SOLUTION INTRAMUSCULAR; INTRAVENOUS; SUBCUTANEOUS
Status: CANCELLED | OUTPATIENT
Start: 2018-06-25 | End: 2018-06-25

## 2018-06-25 RX ORDER — MIDAZOLAM HYDROCHLORIDE 1 MG/ML
.25-1 INJECTION, SOLUTION INTRAMUSCULAR; INTRAVENOUS
Status: CANCELLED | OUTPATIENT
Start: 2018-06-25 | End: 2018-06-25

## 2018-06-25 RX ORDER — SODIUM CHLORIDE 0.9 % (FLUSH) 0.9 %
5-10 SYRINGE (ML) INJECTION AS NEEDED
Status: CANCELLED | OUTPATIENT
Start: 2018-06-25 | End: 2018-06-25

## 2018-06-25 RX ADMIN — SODIUM CHLORIDE: 9 INJECTION, SOLUTION INTRAVENOUS at 15:03

## 2018-06-25 RX ADMIN — PROPOFOL 50 MG: 10 INJECTION, EMULSION INTRAVENOUS at 15:06

## 2018-06-25 RX ADMIN — PROPOFOL 100 MG: 10 INJECTION, EMULSION INTRAVENOUS at 15:04

## 2018-06-25 RX ADMIN — PROPOFOL 50 MG: 10 INJECTION, EMULSION INTRAVENOUS at 15:08

## 2018-06-25 RX ADMIN — LIDOCAINE HYDROCHLORIDE 50 MG: 20 INJECTION, SOLUTION EPIDURAL; INFILTRATION; INTRACAUDAL; PERINEURAL at 15:03

## 2018-06-25 NOTE — ANESTHESIA PREPROCEDURE EVALUATION
Anesthetic History   No history of anesthetic complications            Review of Systems / Medical History  Patient summary reviewed, nursing notes reviewed and pertinent labs reviewed    Pulmonary  Within defined limits                 Neuro/Psych         Psychiatric history     Cardiovascular                       GI/Hepatic/Renal     GERD           Endo/Other        Morbid obesity     Other Findings            Physical Exam    Airway  Mallampati: II  TM Distance: > 6 cm  Neck ROM: normal range of motion   Mouth opening: Normal     Cardiovascular    Rhythm: regular  Rate: normal         Dental  No notable dental hx       Pulmonary  Breath sounds clear to auscultation               Abdominal         Other Findings            Anesthetic Plan    ASA: 3  Anesthesia type: MAC          Induction: Intravenous  Anesthetic plan and risks discussed with: Patient

## 2018-06-25 NOTE — PERIOP NOTES

## 2018-06-25 NOTE — ANESTHESIA POSTPROCEDURE EVALUATION
Post-Anesthesia Evaluation and Assessment    Patient: Dick Elkins MRN: [id number]  SSN: [id number]    YOB: 1980  Age: 34 y.o. Sex: female       Cardiovascular Function/Vital Signs  Visit Vitals    /73    Pulse 74    Temp 36.9 °C (98.5 °F)    Resp 16    Ht 5' (1.524 m)    Wt 113.4 kg (250 lb)    SpO2 97%    BMI 48.82 kg/m2       Patient is status post MAC anesthesia for Procedure(s):  ESOPHAGOGASTRODUODENOSCOPY (EGD)  ESOPHAGOGASTRODUODENAL (EGD) BIOPSY. Nausea/Vomiting: None    Postoperative hydration reviewed and adequate. Pain:  Pain Scale 1: Numeric (0 - 10) (06/25/18 1530)  Pain Intensity 1: 0 (06/25/18 1530)   Managed    Neurological Status: At baseline    Mental Status and Level of Consciousness: Arousable    Pulmonary Status:   O2 Device: Room air (06/25/18 1530)   Adequate oxygenation and airway patent    Complications related to anesthesia: None    Post-anesthesia assessment completed.  No concerns    Signed By: Nila Pedroza MD     June 25, 2018

## 2018-06-25 NOTE — DISCHARGE INSTRUCTIONS
118 [facility]3].  [address] [facility]  [phone]                     DISCHARGE INSTRUCTIONS    Dick Elkins  [id number]  1980    DISCOMFORT:  Sore throat- throat lozenges or warm salt water gargle  redness at IV site- apply warm compress to area; if redness or soreness persist- contact your physician  Gaseous discomfort- walking, belching will help relieve any discomfort  You may not operate a vehicle for 12 hours  You may not engage in an occupation involving machinery or appliances for rest of today  You may not drink alcoholic beverages for at least 12 hours  Avoid making any critical decisions for at least 24 hour  DIET  You may eat and drink after you leave. You may resume your regular diet - however -  remember your colon is empty and a heavy meal will produce gas. Avoid these foods:  vegetables, fried / greasy foods, carbonated drinks    ACTIVITY  You may resume your normal daily activities   Spend the remainder of the day resting -  avoid any strenuous activity. CALL M.D. ANY SIGN OF   Increasing pain, nausea, vomiting  Abdominal distension (swelling)  New increased bleeding (oral or rectal)  Fever (chills)  Pain in chest area  Bloody discharge from nose or mouth  Shortness of breath    Follow-up Instructions:   Call Dr. Micah Romero for any questions or problems. If we took a biopsy please call the office within 2 weeks to discuss your                             pathology results. Telephone # [phone]        Continue same medications.

## 2018-06-25 NOTE — H&P
[facility]  217 [facility] 140 [facility], [address]  [phone]                                History and Physical     NAME: Patricia Yeung   :  1980   MRN:  [id number]     HPI:  The patient was seen and examined. Past Surgical History:   Procedure Laterality Date    HX HEMORRHOIDECTOMY       Past Medical History:   Diagnosis Date    Depression     Dizziness      Social History   Substance Use Topics    Smoking status: Never Smoker    Smokeless tobacco: Former User      Comment: hookah     Alcohol use No     No Known Allergies  Family History   Problem Relation Age of Onset    No Known Problems Mother     No Known Problems Father      No current facility-administered medications for this encounter. PHYSICAL EXAM:  General: WD, WN. Alert, cooperative, no acute distress    HEENT: NC, Atraumatic. PERRLA, EOMI. Anicteric sclerae. Lungs:  CTA Bilaterally. No Wheezing/Rhonchi/Rales. Heart:  Regular  rhythm,  No murmur, No Rubs, No Gallops  Abdomen: Soft, Non distended, Non tender.  +Bowel sounds, no HSM  Extremities: No c/c/e  Neurologic:  CN 2-12 gi, Alert and oriented X 3. No acute neurological distress   Psych:   Good insight. Not anxious nor agitated. The heart, lungs and mental status were satisfactory for the administration of MAC sedation and for the procedure.       Mallampati score: 3       Assessment:   · Epigastric pain    Plan:   · Endoscopic procedure  · MAC sedation   ·

## 2018-06-25 NOTE — IP AVS SNAPSHOT
2700 [facility] [address] [place] [address] 
[phone] Patient: Patricia Yeung MRN: [id number] FPT:3/91/8165 About your hospitalization You were admitted on:  June 25, 2018 You last received care in the:  [facility] ENDOSCOPY You were discharged on:  June 25, 2018 Why you were hospitalized Your primary diagnosis was:  Not on File Follow-up Information None Your Scheduled Appointments Wednesday July 11, 2018  9:15 AM EDT ROUTINE CARE with Alexys Caballero  [facility] [address] [place] [address]  
[phone] Discharge Orders None A check jamil indicates which time of day the medication should be taken. My Medications CONTINUE taking these medications Instructions Each Dose to Equal  
 Morning Noon Evening Bedtime  
 fenofibrate micronized 134 mg capsule Commonly known as:  LOFIBRA Your last dose was: Your next dose is: Take  by mouth every morning. polyethylene glycol 17 gram packet Commonly known as:  Emerson Spaniel Your last dose was: Your next dose is: Take 1 Packet by mouth daily. 17 g PROTONIX 40 mg tablet Generic drug:  pantoprazole Your last dose was: Your next dose is: Take 40 mg by mouth daily. 40 mg Discharge Instructions [address]  
[address] [facility] [address] [place], [address] 
[phone] DISCHARGE INSTRUCTIONS Patricia Yeung 
[id number] 
1980 DISCOMFORT: 
Sore throat- throat lozenges or warm salt water gargle 
redness at IV site- apply warm compress to area; if redness or soreness persist- contact your physician Gaseous discomfort- walking, belching will help relieve any discomfort You may not operate a vehicle for 12 hours You may not engage in an occupation involving machinery or appliances for rest of today You may not drink alcoholic beverages for at least 12 hours Avoid making any critical decisions for at least 24 hour DIET You may eat and drink after you leave. You may resume your regular diet  however -  remember your colon is empty and a heavy meal will produce gas. Avoid these foods:  vegetables, fried / greasy foods, carbonated drinks ACTIVITY You may resume your normal daily activities Spend the remainder of the day resting -  avoid any strenuous activity. CALL M.D. ANY SIGN OF Increasing pain, nausea, vomiting Abdominal distension (swelling) New increased bleeding (oral or rectal) Fever (chills) Pain in chest area Bloody discharge from nose or mouth Shortness of breath Follow-up Instructions: 
 Call Dr. Hieu Arauz for any questions or problems. If we took a biopsy please call the office within 2 weeks to discuss your                             pathology results. Telephone # [phone] Continue same medications. Introducing [facility]! Vladislav Hensley introduces [employer] patient portal. Now you can access parts of your medical record, email your doctor's office, and request medication refills online. 1. In your internet browser, go to [email or web address] 2. Click on the First Time User? Click Here link in the Sign In box. You will see the New Member Sign Up page. 3. Enter your [employer] Access Code exactly as it appears below. You will not need to use this code after youve completed the sign-up process. If you do not sign up before the expiration date, you must request a new code. · [employer] Access Code: [email or web address] Expires: 8/14/2018 11:30 AM 
 
4. Enter the last four digits of your Social Security Number (xxxx) and Date of Birth (mm/dd/yyyy) as indicated and click Submit.  You will be taken to the next sign-up page. 5. Create a Tomorrowt ID. This will be your [employer] login ID and cannot be changed, so think of one that is secure and easy to remember. 6. Create a Tomorrowt password. You can change your password at any time. 7. Enter your Password Reset Question and Answer. This can be used at a later time if you forget your password. 8. Enter your e-mail address. You will receive e-mail notification when new information is available in [address] 9. Click Sign Up. You can now view and download portions of your medical record. 10. Click the Download Summary menu link to download a portable copy of your medical information. If you have questions, please visit the Frequently Asked Questions section of the [employer] website. Remember, [employer] is NOT to be used for urgent needs. For medical emergencies, dial 911. Now available from your iPhone and Android! Introducing Renny Soni As a [facility] patient, I wanted to make you aware of our electronic visit tool called Renny Soni. [facility] 24/7 allows you to connect within minutes with a medical provider 24 hours a day, seven days a week via a mobile device or tablet or logging into a secure website from your computer. You can access Renny Soni from anywhere in the [place]. A virtual visit might be right for you when you have a simple condition and feel like you just dont want to get out of bed, or cant get away from work for an appointment, when your regular [facility] provider is not available (evenings, weekends or holidays), or when youre out of town and need minor care. Electronic visits cost only $49 and if the [employer] 24/7 provider determines a prescription is needed to treat your condition, one can be electronically transmitted to a nearby pharmacy*. Please take a moment to enroll today if you have not already done so.   The enrollment process is free and takes just a few minutes. To enroll, please download the [facility] 24/7 bartolome to your tablet or phone, or visit [email or web address] to enroll on your computer. And, as an [address] [place] [address] patient with a [employer] account, the results of your visits will be scanned into your electronic medical record and your primary care provider will be able to view the scanned results. We urge you to continue to see your regular [facility] provider for your ongoing medical care. And while your primary care provider may not be the one available when you seek a [facility] virtual visit, the peace of mind you get from getting a real diagnosis real time can be priceless. For more information on [employer], view our Frequently Asked Questions (FAQs) at [email or web address]. Sincerely, 
 
Carlene Venegas MD 
Chief Medical Officer Miguel Melissa Zuleta *:  certain medications cannot be prescribed via [employer] Providers Seen During Your Hospitalization Provider Specialty Primary office phone Yecenia Diez MD Gastroenterology [phone] Your Primary Care Physician (PCP) Primary Care Physician Office Phone Office Fax Joel Sayneda [phone] You are allergic to the following No active allergies Recent Documentation Height Weight BMI OB Status Smoking Status 1.524 m 113.4 kg 48.82 kg/m2 Having regular periods Never Smoker Emergency Contacts Name Discharge Info Relation Home Work Mobile Fadi Cortes  Child [2] [phone] Markos Beltran  Son [22]   [phone] Elvin Bernardo  Child [2] [phone] Patient Belongings The following personal items are in your possession at time of discharge: 
  Dental Appliances: None  Visual Aid: None Please provide this summary of care documentation to your next provider. Signatures-by signing, you are acknowledging that this After Visit Summary has been reviewed with you and you have received a copy. Patient Signature:  ____________________________________________________________ Date:  ____________________________________________________________  
  
Aries Matsu Provider Signature:  ____________________________________________________________ Date:  ____________________________________________________________

## 2018-06-25 NOTE — ROUTINE PROCESS
Chante Domínguez  1980  [id number]    Situation:  Verbal report received from: SUSIE Alex, RN  Procedure: Procedure(s):  ESOPHAGOGASTRODUODENOSCOPY (EGD)  ESOPHAGOGASTRODUODENAL (EGD) BIOPSY    Background:    Preoperative diagnosis: BLOATING, CHEST PAIN, GERD  Postoperative diagnosis: Small Hiatial Hernia     :  Dr. Gege Urena  Assistant(s): Endoscopy Technician-1: Saul Harrington  Endoscopy RN-1: Rachael Agarwal RN    Specimens: * No specimens in log *  H. Pylori  yes    Assessment:  Intra-procedure medications     Anesthesia gave intra-procedure sedation and medications, see anesthesia flow sheet yes    Intravenous fluids: NS@ KVO     Vital signs stable     Abdominal assessment: round and soft     Recommendation:  Discharge patient per MD order.     Family or Friend   Permission to share finding with family or friend yes

## 2018-07-11 ENCOUNTER — OFFICE VISIT (OUTPATIENT)
Dept: FAMILY MEDICINE CLINIC | Age: 38
End: 2018-07-11

## 2018-07-11 VITALS
DIASTOLIC BLOOD PRESSURE: 55 MMHG | OXYGEN SATURATION: 98 % | TEMPERATURE: 98.5 F | WEIGHT: 253 LBS | BODY MASS INDEX: 49.67 KG/M2 | SYSTOLIC BLOOD PRESSURE: 121 MMHG | RESPIRATION RATE: 18 BRPM | HEART RATE: 84 BPM | HEIGHT: 60 IN

## 2018-07-11 DIAGNOSIS — M21.612 BUNION, LEFT FOOT: ICD-10-CM

## 2018-07-11 DIAGNOSIS — M79.605 LEFT LEG PAIN: Primary | ICD-10-CM

## 2018-07-11 DIAGNOSIS — M79.672 LEFT FOOT PAIN: ICD-10-CM

## 2018-07-11 DIAGNOSIS — E66.01 OBESITY, MORBID (HCC): ICD-10-CM

## 2018-07-11 NOTE — PATIENT INSTRUCTIONS

## 2018-07-11 NOTE — MR AVS SNAPSHOT
[address] 
[phone] Patient: Oniel Branham MRN: [id number] DIANE:[birth year] Visit Information Date & Time Provider Department Dept. Phone Encounter #  
 7/11/2018  9:15 AM Wilian Heredia  [place] [phone] [id number] Follow-up Instructions Return in about 3 months (around 10/11/2018) for Follow Up. Upcoming Health Maintenance Date Due Influenza Age 5 to Adult 8/1/2018 PAP AKA CERVICAL CYTOLOGY 6/6/2021 DTaP/Tdap/Td series (2 - Td) 2/14/2028 Allergies as of 7/11/2018  Review Complete On: 6/25/2018 By: Kyleigh Ugalde RN No Known Allergies Current Immunizations  Never Reviewed Name Date Influenza Vaccine (Quad) PF 2/14/2018 Tdap 2/14/2018 Not reviewed this visit You Were Diagnosed With   
  
 Codes Comments Left leg pain    -  Primary ICD-10-CM: M79.605 ICD-9-CM: 729.5 Bunion, left foot     ICD-10-CM: M25.245 ICD-9-CM: 727.1 Left foot pain     ICD-10-CM: H72.457 ICD-9-CM: 729.5 Obesity, morbid ([facility] [address]     ICD-10-CM: E66.01 
ICD-9-CM: 278.01 Vitals BP Pulse Temp Resp Height(growth percentile) Weight(growth percentile) 121/55 (BP 1 Location: Left arm, BP Patient Position: Sitting) 84 98.5 °F (36.9 °C) (Oral) 18 5' (1.524 m) 253 lb (114.8 kg) SpO2 BMI OB Status Smoking Status 98% 49.41 kg/m2 Having regular periods Never Smoker Vitals History BMI and BSA Data Body Mass Index Body Surface Area  
 49.41 kg/m 2 2.2 m 2 Preferred Pharmacy Pharmacy Name Phone [facility] [address] [facility], [address] [facility] [phone] Your Updated Medication List  
  
   
This list is accurate as of 7/11/18 10:59 AM.  Always use your most recent med list.  
  
  
  
  
 fenofibrate micronized 134 mg capsule Commonly known as:  LOFIBRA Take  by mouth every morning. polyethylene glycol 17 gram packet Commonly known as:  Kale Kramer Take 1 Packet by mouth daily. PROTONIX 40 mg tablet Generic drug:  pantoprazole Take 40 mg by mouth daily. We Performed the Following REFERRAL TO PODIATRY [REF90 Custom] Follow-up Instructions Return in about 3 months (around 10/11/2018) for Follow Up. Referral Information Referral ID Referred By Referred To  
  
 [id number] Nubia Cota DPM   
   [address] [facility] Phone: [phone] Fax: [phone] Visits Status Start Date End Date 1 New Request 7/11/18 7/11/19 If your referral has a status of pending review or denied, additional information will be sent to support the outcome of this decision. Patient Instructions Foot Pain: Care Instructions Your Care Instructions Foot injuries that cause pain and swelling are fairly common. Almost all sports or home repair projects can cause a misstep that ends up as foot pain. Normal wear and tear, especially as you get older, also can cause foot pain. Most minor foot injuries will heal on their own, and home treatment is usually all you need to do. If you have a severe injury, you may need tests and treatment. Follow-up care is a key part of your treatment and safety. Be sure to make and go to all appointments, and call your doctor if you are having problems. It's also a good idea to know your test results and keep a list of the medicines you take. How can you care for yourself at home? · Take pain medicines exactly as directed. ¨ If the doctor gave you a prescription medicine for pain, take it as prescribed. ¨ If you are not taking a prescription pain medicine, ask your doctor if you can take an over-the-counter medicine. · Rest and protect your foot. Take a break from any activity that may cause pain. · Put ice or a cold pack on your foot for 10 to 20 minutes at a time. Put a thin cloth between the ice and your skin. · Prop up the sore foot on a pillow when you ice it or anytime you sit or lie down during the next 3 days. Try to keep it above the level of your heart. This will help reduce swelling. · Your doctor may recommend that you wrap your foot with an elastic bandage. Keep your foot wrapped for as long as your doctor advises. · If your doctor recommends crutches, use them as directed. · Wear roomy footwear. · As soon as pain and swelling end, begin gentle exercises of your foot. Your doctor can tell you which exercises will help. When should you call for help? Call 911 anytime you think you may need emergency care. For example, call if: 
  · Your foot turns pale, white, blue, or cold.  
 Call your doctor now or seek immediate medical care if: 
  · You cannot move or stand on your foot.  
  · Your foot looks twisted or out of its normal position.  
  · Your foot is not stable when you step down.  
  · You have signs of infection, such as: 
¨ Increased pain, swelling, warmth, or redness. ¨ Red streaks leading from the sore area. ¨ Pus draining from a place on your foot. ¨ A fever.  
  · Your foot is numb or tingly.  
 Watch closely for changes in your health, and be sure to contact your doctor if: 
  · You do not get better as expected.  
  · You have bruises from an injury that last longer than 2 weeks. Where can you learn more? Go to http://aleksey-michael.info/. Enter U109 in the search box to learn more about \"Foot Pain: Care Instructions. \" Current as of: November 29, 2017 Content Version: 11.7 © 8589-2424 [employer]. Care instructions adapted under license by [employer] (which disclaims liability or warranty for this information).  If you have questions about a medical condition or this instruction, always ask your healthcare professional. [place] [address] any warranty or liability for your use of this information. Introducing [facility]! [employer] introduces SurePeak patient portal. Now you can access parts of your medical record, email your doctor's office, and request medication refills online. 1. In your internet browser, go to [email or web address] 2. Click on the First Time User? Click Here link in the Sign In box. You will see the New Member Sign Up page. 3. Enter your SurePeak Access Code exactly as it appears below. You will not need to use this code after youve completed the sign-up process. If you do not sign up before the expiration date, you must request a new code. · SurePeak Access Code: [email or web address] Expires: 8/14/2018 11:30 AM 
 
4. Enter the last four digits of your Social Security Number (xxxx) and Date of Birth (mm/dd/yyyy) as indicated and click Submit. You will be taken to the next sign-up page. 5. Create a SurePeak ID. This will be your SurePeak login ID and cannot be changed, so think of one that is secure and easy to remember. 6. Create a SurePeak password. You can change your password at any time. 7. Enter your Password Reset Question and Answer. This can be used at a later time if you forget your password. 8. Enter your e-mail address. You will receive e-mail notification when new information is available in [address] 9. Click Sign Up. You can now view and download portions of your medical record. 10. Click the Download Summary menu link to download a portable copy of your medical information. If you have questions, please visit the Frequently Asked Questions section of the SurePeak website. Remember, SurePeak is NOT to be used for urgent needs. For medical emergencies, dial 911. Now available from your iPhone and Android! Please provide this summary of care documentation to your next provider. Your primary care clinician is listed as Britney Dangelo. If you have any questions after today's visit, please call [phone].

## 2018-07-11 NOTE — PROGRESS NOTES
Chief Complaint   Patient presents with    Abdominal Pain     1 week follow up    Leg Pain     left leg pain and swelling x 1 week     1. Have you been to the ER, urgent care clinic since your last visit? Hospitalized since your last visit? No    2. Have you seen or consulted any other health care providers outside of the [address] [place] [address] since your last visit? Include any pap smears or colon screening.  No

## 2018-07-11 NOTE — LETTER
7/11/2018 Ms. Annie Wheeler [phone] [place] [address] To Whom It May Concern: 
 
Magaly Jenkins was under the care of [address] [place] [address]. She is cleared to undergo dental surgery. She has been evaluated for atypical chest pain with no cardiac origin. If there are questions or concerns please have the patient contact our office. Sincerely, Yusuf Villarreal NP

## 2018-07-13 LAB — HEMOCCULT STL QL IA: NEGATIVE

## 2018-07-18 NOTE — PROGRESS NOTES
Assessment/Plan:     Diagnoses and all orders for this visit:    1. Left leg pain  Unclear etiology. Appears musculoskeletal in nature exacerbated by positions. I have encouraged her to remain active. Symptoms are mild and fleeting in nature. Will continue to monitor. 2. Bunion, left foot  -     REFERRAL TO PODIATRY for evaluation. 3. Left foot pain  May have a collapsed arch as well. Podiatry to confirm. 4. Obesity, morbid ([address]  I have reviewed/discussed the above normal BMI with the patient. I have recommended the following interventions: dietary management education, guidance, and counseling, encourage exercise, monitor weight and prescribed dietary intake. Given written instructions as well. Follow-up Disposition:  Return in about 3 months (around 10/11/2018) for Follow Up. Discussed expected course/resolution/complications of diagnosis in detail with patient.    Medication risks/benefits/costs/interactions/alternatives discussed with patient.    Pt was given after visit summary which includes diagnoses, current medications & vitals. Pt expressed understanding with the diagnosis and plan      Time: Greater than 25 minutes was spent with this patient face to face discussing  diagnoses, risk factors and treatment with greater than 50% of this time was spent in counseling and coordination of care of leg and foot pain. Subjective:      Zee Abbott is a 34 y.o. female who presents for had concerns including Abdominal Pain and Leg Pain. Here for multiple complaints. Translation is assisted with her daughter today as she is Pashto-speaking only. Reports a history of left foot pain for several years. Previously evaluated by podiatry. Would like to return for treatment. Exacerbated by walking and wearing shoes. Reports she was previously treated with a medication that worked well but cannot recall at this time. Not currently following a particular diet plans.   Not currently interested in exercise regimen. Reports a history of left leg pain dating back several years. Symptoms are worse in the morning. Reports associated swelling without redness. Symptoms are worse in the afternoon as she notes swelling in the lower extremities as well. Current Outpatient Prescriptions   Medication Sig Dispense Refill    polyethylene glycol (MIRALAX) 17 gram packet Take 1 Packet by mouth daily. 30 Packet 6    pantoprazole (PROTONIX) 40 mg tablet Take 40 mg by mouth daily.  fenofibrate micronized (LOFIBRA) 134 mg capsule Take  by mouth every morning. No Known Allergies    ROS:   Review of Systems   Respiratory: Negative for shortness of breath. Cardiovascular: Negative for chest pain. Musculoskeletal: Positive for joint pain. Objective:     Visit Vitals    /55 (BP 1 Location: Left arm, BP Patient Position: Sitting)    Pulse 84    Temp 98.5 °F (36.9 °C) (Oral)    Resp 18    Ht 5' (1.524 m)    Wt 253 lb (114.8 kg)    SpO2 98%    BMI 49.41 kg/m2       Vitals and Nurse Documentation reviewed. Physical Exam   Constitutional: She is well-developed, well-nourished, and in no distress. Musculoskeletal:        Right knee: She exhibits normal range of motion, no swelling, no effusion, no ecchymosis, no deformity, no erythema and normal patellar mobility. No tenderness found. Left knee: She exhibits normal range of motion, no swelling, no effusion, no ecchymosis, no deformity, no erythema and normal patellar mobility. No tenderness found. Feet:    Negative Lachman. Negative Anterior Drawer. Negative Poster Drawer. Negative ballotment. Negative Valgus. Negative Varus. Neurological: She displays no weakness. Gait normal.   Skin: Skin is warm and dry.    Psychiatric: Mood, affect and judgment normal.

## 2018-07-30 DIAGNOSIS — R07.9 CHEST PAIN, UNSPECIFIED TYPE: ICD-10-CM

## 2018-07-30 NOTE — TELEPHONE ENCOUNTER
----- Message from [facility] sent at 2018 12:06 PM EDT -----  Regarding: RIVAS Franco/Refill  Contact: [phone]  Pt stated pharmacy said \"Fenofibrate 134 mg\" has  and the NP has to send a new script to [facility] [phone]. Pt stated she is out of medication.

## 2018-07-31 RX ORDER — FENOFIBRATE 134 MG/1
134 CAPSULE ORAL
Qty: 30 CAP | Refills: 2 | Status: SHIPPED | OUTPATIENT
Start: 2018-07-31 | End: 2020-05-26

## 2018-11-16 ENCOUNTER — OFFICE VISIT (OUTPATIENT)
Dept: FAMILY MEDICINE CLINIC | Age: 38
End: 2018-11-16

## 2018-11-16 VITALS
HEIGHT: 60 IN | SYSTOLIC BLOOD PRESSURE: 125 MMHG | OXYGEN SATURATION: 98 % | DIASTOLIC BLOOD PRESSURE: 75 MMHG | WEIGHT: 251 LBS | RESPIRATION RATE: 18 BRPM | HEART RATE: 72 BPM | BODY MASS INDEX: 49.28 KG/M2 | TEMPERATURE: 98.2 F

## 2018-11-16 DIAGNOSIS — F41.1 GAD (GENERALIZED ANXIETY DISORDER): ICD-10-CM

## 2018-11-16 DIAGNOSIS — A04.8 H. PYLORI INFECTION: Primary | ICD-10-CM

## 2018-11-16 DIAGNOSIS — Z23 ENCOUNTER FOR IMMUNIZATION: ICD-10-CM

## 2018-11-16 RX ORDER — OMEPRAZOLE 40 MG/1
40 CAPSULE, DELAYED RELEASE ORAL 2 TIMES DAILY
Qty: 28 CAP | Refills: 0 | Status: SHIPPED | OUTPATIENT
Start: 2018-11-16 | End: 2018-11-30

## 2018-11-16 RX ORDER — GEMFIBROZIL 600 MG/1
600 TABLET, FILM COATED ORAL 2 TIMES DAILY
COMMUNITY
End: 2020-05-26

## 2018-11-16 RX ORDER — AMOXICILLIN 500 MG/1
1000 CAPSULE ORAL 2 TIMES DAILY
Qty: 56 CAP | Refills: 0 | Status: SHIPPED | OUTPATIENT
Start: 2018-11-16 | End: 2018-11-30

## 2018-11-16 RX ORDER — OMEPRAZOLE 40 MG/1
40 CAPSULE, DELAYED RELEASE ORAL DAILY
COMMUNITY
End: 2020-05-26

## 2018-11-16 RX ORDER — CLARITHROMYCIN 500 MG/1
500 TABLET, FILM COATED ORAL 2 TIMES DAILY
Qty: 28 TAB | Refills: 0 | Status: SHIPPED | OUTPATIENT
Start: 2018-11-16 | End: 2018-11-30

## 2018-11-16 NOTE — PROGRESS NOTES
Chief Complaint   Patient presents with    Dizziness     x 1 month     Labs     fasting     1. Have you been to the ER, urgent care clinic since your last visit? Hospitalized since your last visit? No    2. Have you seen or consulted any other health care providers outside of the [address] [place] [address] since your last visit? Include any pap smears or colon screening.  No

## 2018-11-16 NOTE — PROGRESS NOTES
Assessment/Plan:     Diagnoses and all orders for this visit:    1. H. pylori infection  -     amoxicillin (AMOXIL) 500 mg capsule; Take 2 Caps by mouth two (2) times a day for 14 days. -     clarithromycin (BIAXIN) 500 mg tablet; Take 1 Tab by mouth two (2) times a day for 14 days. -     omeprazole (PRILOSEC) 40 mg capsule; Take 1 Cap by mouth two (2) times a day for 14 days. Treatment as above. She will follow-up Dr. Vini Conner as previously recommended. 2. Encounter for immunization  -     INFLUENZA VIRUS VAC QUAD,SPLIT,PRESV FREE SYRINGE IM  -     VT IMMUNIZ ADMIN,1 SINGLE/COMB VAC/TOXOID    3. DOUGLAS (generalized anxiety disorder)  We have discussed lifestyle modification including her considering working outside of the home which she has attempted in the past.  We could consider treatment with SSRI therapy if no improvement. Time: Greater than 25 minutes was spent with this patient face to face discussing  diagnoses, risk factors and treatment with greater than 50% of this time was spent in counseling and coordination of care of stress reduction, self-care. Follow-up Disposition:  Return in about 3 months (around 2/16/2019) for Follow Up. Discussed expected course/resolution/complications of diagnosis in detail with patient.    Medication risks/benefits/costs/interactions/alternatives discussed with patient.    Pt was given after visit summary which includes diagnoses, current medications & vitals. Pt expressed understanding with the diagnosis and plan          Subjective:      Joe Bhatti is a 34 y.o. female who presents for had concerns including Dizziness (x 1 month ) and Labs (fasting). She is present with her daughter today for multitude of symptoms. Her daughter assists with translation as the patient is Mohawk speaking only. She continues to experience epigastric pain. Previous EGD with Dr. Vini Conner did reveal positive H. pylori.   She has discontinued PPI therapy 1 week ago with an exacerbation of her symptoms. She did not follow through with treatment of H. pylori in the past.  She has not followed up with [facility] for evaluation. She is unsure if she has insurance at this time. It appears her insurance may not currently be active at this time so they are concerned regarding cost of medications. She reports a history of anxiety disorder. She reports frequent stress. She has attempted to find employment and also get a learners permit however this has been limited with her limited [place]. She has never been treated for anxiety in the past.  Her daughter says that this is quite severe at home. She is experiencing intermittent headache and dizziness with prolonged standing which resolves upon sitting. Diet is poor. Fluid intake is adequate. Current Outpatient Medications   Medication Sig Dispense Refill    gemfibrozil (LOPID) 600 mg tablet Take 600 mg by mouth two (2) times a day.  omeprazole (PRILOSEC) 40 mg capsule Take 40 mg by mouth daily.  amoxicillin (AMOXIL) 500 mg capsule Take 2 Caps by mouth two (2) times a day for 14 days. 56 Cap 0    clarithromycin (BIAXIN) 500 mg tablet Take 1 Tab by mouth two (2) times a day for 14 days. 28 Tab 0    omeprazole (PRILOSEC) 40 mg capsule Take 1 Cap by mouth two (2) times a day for 14 days. 28 Cap 0    polyethylene glycol (MIRALAX) 17 gram packet Take 1 Packet by mouth daily. 30 Packet 6    fenofibrate micronized (LOFIBRA) 134 mg capsule Take 1 Cap by mouth every morning. 30 Cap 2    pantoprazole (PROTONIX) 40 mg tablet Take 40 mg by mouth daily. No Known Allergies    ROS:   Review of Systems   Constitutional: Negative for malaise/fatigue. Eyes: Negative for blurred vision. Respiratory: Negative for shortness of breath. Cardiovascular: Negative for chest pain. Gastrointestinal: Positive for abdominal pain. Neurological: Positive for dizziness and headaches.        Objective:     Visit Vitals  BP 125/75 (BP 1 Location: Left arm, BP Patient Position: Sitting)   Pulse 72   Temp 98.2 °F (36.8 °C) (Oral)   Resp 18   Ht 5' (1.524 m)   Wt 251 lb (113.9 kg)   LMP 11/08/2018 (Approximate)   SpO2 98%   BMI 49.02 kg/m²       Vitals and Nurse Documentation reviewed. Physical Exam   Constitutional: No distress. Cardiovascular: S1 normal and S2 normal. Exam reveals no gallop and no friction rub. No murmur heard. Pulmonary/Chest: Breath sounds normal. No respiratory distress. Abdominal: Soft. Bowel sounds are normal. She exhibits no distension and no mass. There is no tenderness. There is no rebound and no guarding. Skin: Skin is warm and dry. Psychiatric: Affect normal. Her mood appears anxious.

## 2019-02-25 ENCOUNTER — OFFICE VISIT (OUTPATIENT)
Dept: FAMILY MEDICINE CLINIC | Age: 39
End: 2019-02-25

## 2019-02-25 VITALS
RESPIRATION RATE: 15 BRPM | BODY MASS INDEX: 49.87 KG/M2 | WEIGHT: 254 LBS | DIASTOLIC BLOOD PRESSURE: 77 MMHG | TEMPERATURE: 98 F | SYSTOLIC BLOOD PRESSURE: 132 MMHG | HEIGHT: 60 IN | OXYGEN SATURATION: 98 % | HEART RATE: 71 BPM

## 2019-02-25 DIAGNOSIS — Z87.898 HISTORY OF SNORING: Primary | ICD-10-CM

## 2019-02-25 DIAGNOSIS — A04.8 H. PYLORI INFECTION: ICD-10-CM

## 2019-02-25 DIAGNOSIS — R05.9 COUGH: ICD-10-CM

## 2019-02-25 NOTE — PATIENT INSTRUCTIONS
Cough: Care Instructions  Your Care Instructions    A cough is your body's response to something that bothers your throat or airways. Many things can cause a cough. You might cough because of a cold or the flu, bronchitis, or asthma. Smoking, postnasal drip, allergies, and stomach acid that backs up into your throat also can cause coughs. A cough is a symptom, not a disease. Most coughs stop when the cause, such as a cold, goes away. You can take a few steps at home to cough less and feel better. Follow-up care is a key part of your treatment and safety. Be sure to make and go to all appointments, and call your doctor if you are having problems. It's also a good idea to know your test results and keep a list of the medicines you take. How can you care for yourself at home? · Drink lots of water and other fluids. This helps thin the mucus and soothes a dry or sore throat. Honey or lemon juice in hot water or tea may ease a dry cough. · Take cough medicine as directed by your doctor. · Prop up your head on pillows to help you breathe and ease a dry cough. · Try cough drops to soothe a dry or sore throat. Cough drops don't stop a cough. Medicine-flavored cough drops are no better than candy-flavored drops or hard candy. · Do not smoke. Avoid secondhand smoke. If you need help quitting, talk to your doctor about stop-smoking programs and medicines. These can increase your chances of quitting for good. When should you call for help? Call 911 anytime you think you may need emergency care.  For example, call if:    · You have severe trouble breathing.    Call your doctor now or seek immediate medical care if:    · You cough up blood.     · You have new or worse trouble breathing.     · You have a new or higher fever.     · You have a new rash.    Watch closely for changes in your health, and be sure to contact your doctor if:    · You cough more deeply or more often, especially if you notice more mucus or a change in the color of your mucus.     · You have new symptoms, such as a sore throat, an earache, or sinus pain.     · You do not get better as expected. Where can you learn more? Go to http://aleksey-michael.info/. Enter D279 in the search box to learn more about \"Cough: Care Instructions. \"  Current as of: September 5, 2018  Content Version: 11.9  © 6686-3864 [employer]. Care instructions adapted under license by [employer] (which disclaims liability or warranty for this information). If you have questions about a medical condition or this instruction, always ask your healthcare professional. [address] any warranty or liability for your use of this information.

## 2019-02-25 NOTE — PROGRESS NOTES
Chief Complaint   Patient presents with    Abdominal Pain    Dizziness    Labs     1. Have you been to the ER, urgent care clinic since your last visit? Hospitalized since your last visit? No    2. Have you seen or consulted any other health care providers outside of the [address] [place] [address] since your last visit? Include any pap smears or colon screening.  No

## 2019-02-26 NOTE — PROGRESS NOTES
Assessment/Plan:     Diagnoses and all orders for this visit:    1. History of snoring  She is working on insurance coverage. Once established, will refer to [facility]. 2. H. pylori infection  Discussed [address] and cash price of medications which they can afford. She will fill and continue with recommended treatment. Long term goals include referral to gastro when insurance is available. If barriers continue, refer to [facility]. 3. Cough  Likely GERD related. Treatment as above and follow up if symptoms persist.       Follow-up Disposition:  Return in about 3 months (around 5/25/2019). Discussed expected course/resolution/complications of diagnosis in detail with patient.    Medication risks/benefits/costs/interactions/alternatives discussed with patient.    Pt was given after visit summary which includes diagnoses, current medications & vitals. Pt expressed understanding with the diagnosis and plan          Subjective:      Josiah Mobley is a 34 y.o. female who presents for had concerns including Abdominal Pain; Dizziness; and Labs. She is accompanied with her daughter who assists with translation. She has not filled treatment for H. Pylori as she lost insurance coverage for unknown reasons. She continues with intermittent moderate epigastric discomfort. She has not attempted dietary change or exercise as previously discussed for weight loss. She reports concerns for snoring today. She is interested in evaluation. There is no witnessed apnea. She reports daytime fatigue. This is her first evaluation. She reports a history of cough recently. This is not associated with fever, chills, or body aches. She is not taking otc medications. She feels otherwise well. Current Outpatient Medications   Medication Sig Dispense Refill    gemfibrozil (LOPID) 600 mg tablet Take 600 mg by mouth two (2) times a day.       polyethylene glycol (MIRALAX) 17 gram packet Take 1 Packet by mouth daily. 30 Packet 6    omeprazole (PRILOSEC) 40 mg capsule Take 40 mg by mouth daily.  fenofibrate micronized (LOFIBRA) 134 mg capsule Take 1 Cap by mouth every morning. 30 Cap 2    pantoprazole (PROTONIX) 40 mg tablet Take 40 mg by mouth daily. No Known Allergies    ROS:   Review of Systems   Constitutional: Negative for chills, fever and malaise/fatigue. HENT: Negative for congestion, ear pain, sinus pain and sore throat. Respiratory: Positive for cough. Negative for sputum production, shortness of breath and wheezing. Cardiovascular: Negative for chest pain. Gastrointestinal: Positive for abdominal pain. Negative for blood in stool, constipation, diarrhea, heartburn, melena, nausea and vomiting. Neurological: Negative for seizures. Endo/Heme/Allergies: Negative for environmental allergies. Objective:     Visit Vitals  /77   Pulse 71   Temp 98 °F (36.7 °C) (Oral)   Resp 15   Ht 5' (1.524 m)   Wt 254 lb (115.2 kg)   LMP 02/21/2019 (Exact Date)   SpO2 98%   BMI 49.61 kg/m²       Vitals and Nurse Documentation reviewed. Physical Exam   Constitutional: No distress. Cardiovascular: S1 normal and S2 normal. Exam reveals no gallop and no friction rub. No murmur heard. Pulmonary/Chest: Breath sounds normal. No respiratory distress. Abdominal: Soft. Bowel sounds are normal. She exhibits no distension and no mass. There is no tenderness. There is no rebound and no guarding. Skin: Skin is warm and dry.    Psychiatric: Mood and affect normal.

## 2019-03-02 ENCOUNTER — OFFICE VISIT (OUTPATIENT)
Dept: URGENT CARE | Age: 39
End: 2019-03-02

## 2019-03-02 VITALS
HEART RATE: 100 BPM | OXYGEN SATURATION: 97 % | DIASTOLIC BLOOD PRESSURE: 82 MMHG | RESPIRATION RATE: 18 BRPM | WEIGHT: 253 LBS | SYSTOLIC BLOOD PRESSURE: 139 MMHG | TEMPERATURE: 98.6 F | HEIGHT: 60 IN | BODY MASS INDEX: 49.67 KG/M2

## 2019-03-02 DIAGNOSIS — R22.1 THROAT SWELLING: ICD-10-CM

## 2019-03-02 DIAGNOSIS — M79.5 FOREIGN BODY (FB) IN SOFT TISSUE: Primary | ICD-10-CM

## 2019-03-03 NOTE — PROGRESS NOTES
After being seen by provider pt advised to be evaluated in the emergency department.  Report called to Leigh Ann Torres at [facility].

## 2019-03-03 NOTE — PATIENT INSTRUCTIONS
Go directly to the Emergency department for full evaluation of your throat. Our xray here showed that you have swelling in your throat and you need a CT scan of your neck for further evaluation.

## 2019-03-03 NOTE — PROGRESS NOTES
Fri night at about 11, she ate a piece of chicken that either had a bone in it or was very hard and caused her to choke and have pain. She tried first to dislodge it with her finger then used a spoon. She had pain and choking and some blood. She waited but had pain all day and was having difficulty swallowing fluids but could eat a banana      The history is provided by the patient and a relative. The history is limited by a language barrier. A  was used (language line used for [facility]). Foreign Body Swallowed   This is a new problem. The current episode started yesterday. The problem occurs constantly. The problem has not changed (bleeding resolved) since onset. Pertinent negatives include no chest pain. The symptoms are aggravated by swallowing. Nothing relieves the symptoms. She has tried nothing for the symptoms. The treatment provided no relief.         Past Medical History:   Diagnosis Date    Depression     Dizziness         Past Surgical History:   Procedure Laterality Date    HX HEMORRHOIDECTOMY           Family History   Problem Relation Age of Onset    No Known Problems Mother     No Known Problems Father         Social History     Socioeconomic History    Marital status:      Spouse name: Not on file    Number of children: Not on file    Years of education: Not on file    Highest education level: Not on file   Social Needs    Financial resource strain: Not on file    Food insecurity - worry: Not on file    Food insecurity - inability: Not on file   [employer] needs - medical: Not on file   [employer] needs - non-medical: Not on file   Occupational History    Not on file   Tobacco Use    Smoking status: Never Smoker    Smokeless tobacco: Former User    Tobacco comment: hookah    Substance and Sexual Activity    Alcohol use: No    Drug use: No    Sexual activity: Not Currently     Partners: Male     Birth control/protection: None   Other Topics Concern    Not on file   Social History Narrative    Not on file                ALLERGIES: Patient has no known allergies. Review of Systems   Constitutional: Negative. HENT: Positive for sore throat (pain with swallowing). Respiratory: Positive for choking (last night). Cardiovascular: Negative for chest pain. Gastrointestinal: Negative. Musculoskeletal: Negative. Vitals:    03/02/19 2008   BP: 139/82   Pulse: 100   Resp: 18   Temp: 98.6 °F (37 °C)   SpO2: 97%   Weight: 253 lb (114.8 kg)   Height: 5' (1.524 m)       Physical Exam   Constitutional: She is oriented to person, place, and time. She appears well-developed and well-nourished. HENT:   Head: Normocephalic and atraumatic. Right Ear: External ear normal.   Left Ear: External ear normal.   Mouth/Throat: No oropharyngeal exudate. Speech rapid and clear and pt managing secretions well No stridor or cough. No trismus, mild posterior pharynx edema, without midline shift. No blood appreciated   Eyes: Conjunctivae and EOM are normal. Right eye exhibits no discharge. Left eye exhibits no discharge. No scleral icterus. Neck: Normal range of motion. Neck supple. No tracheal deviation present. No thyromegaly present. FROM   Cardiovascular: Normal rate, regular rhythm and normal heart sounds. No murmur heard. Pulmonary/Chest: Effort normal and breath sounds normal. No respiratory distress. She has no wheezes. She has no rales. She exhibits no tenderness. Abdominal: Soft. Bowel sounds are normal. She exhibits no distension. There is no tenderness. There is no rebound and no guarding. Lymphadenopathy:     She has no cervical adenopathy. Neurological: She is alert and oriented to person, place, and time. No cranial nerve deficit. Coordination normal.   Skin: Skin is warm and dry. No erythema. Psychiatric: She has a normal mood and affect.  Her behavior is normal. Judgment and thought content normal.   Nursing note and vitals reviewed. [facility]    ICD-10-CM ICD-9-CM    1. Foreign body (FB) in soft tissue M79.5 729.6 XR NECK SOFT TISSUE   2. Throat swelling R22.1 784.2      No orders of the defined types were placed in this encounter. The patients condition was discussed with the patient and they understand. The patient is to follow up with primary care doctor ,If signs and symptoms become worse the pt is to go to the ER. The patient is to take medications as prescribed. Using the language line, the patient and family were informed of the need to get to the ED now for further evaluation including a CT of the neck. She was informed of the xray results showing swelling and air from a possible perforation. Airway remained stable while in the UC.  The patient is choosing [employer].      Procedures

## 2020-05-26 ENCOUNTER — VIRTUAL VISIT (OUTPATIENT)
Dept: FAMILY MEDICINE CLINIC | Age: 40
End: 2020-05-26

## 2020-05-26 DIAGNOSIS — R07.9 CHEST PAIN, UNSPECIFIED TYPE: ICD-10-CM

## 2020-05-26 DIAGNOSIS — M25.561 RIGHT KNEE PAIN, UNSPECIFIED CHRONICITY: ICD-10-CM

## 2020-05-26 DIAGNOSIS — Z13.220 SCREENING FOR HYPERLIPIDEMIA: ICD-10-CM

## 2020-05-26 DIAGNOSIS — Z86.19 HISTORY OF HELICOBACTER PYLORI INFECTION: ICD-10-CM

## 2020-05-26 DIAGNOSIS — R10.2 PELVIC PAIN: Primary | ICD-10-CM

## 2020-05-26 DIAGNOSIS — R45.86 MOOD CHANGES: ICD-10-CM

## 2020-05-26 DIAGNOSIS — R22.42 LOCALIZED SWELLING OF LEFT FOOT: ICD-10-CM

## 2020-05-26 NOTE — PROCEDURES
[address] [facility].  [address] [facility] [address], [address]  [phone]                            NAME:  Torito Hutchins   :   1980   MRN:   [id number]     Date/Time:  2018 3:23 PM    Esophagogastroduodenoscopy (EGD) Procedure Note    :  Tejinder Cabrera MD    Referring Provider: Katia Alexander NP    Anethesia/Sedation:  MAC anesthesia    Procedure Details     After infom consent was obtained for the procedure, with all risks and benefits of procedure explained the patient was taken to the endoscopy suite and placed in the left lateral decubitus position. Following sequential administration of sedation as per above, the [id number] gastroscope was inserted into the mouth and advanced under direct vision to second portion of the duodenum. A careful inspection was made as the gastroscope was withdrawn, including a retroflexed view of the proximal stomach; findings and interventions are described below. Findings:  Esophagus: Small sliding hiatal hernia was noted. No esophagitis was seen. Stomach: Mild diffuse erythema was noted in the gastric antrum. No ulcer or erosion was seen  Duodenum/jejunum:normal      Therapies:  biopsy of stomach antrum    Specimens: Gastric biopsy for STEPHANE test           EBL: None    Complications:   None; patient tolerated the procedure well. Impression:    See Postoperative diagnosis above    Recommendations:  -Continue acid suppression. , -Await STEPHANE test result and treat for Helicobacter pylori if positive. , -GERD diet: avoid fried and fatty foods. peppermint, chocolate, alcohol, coffee, citrus fruits and juices, tomoato products; avoid lying down for 2 to 3 hours after eating.     Discharge disposition:  Home in the company of  when able to ambulate    Tejinder Cabrera MD covid testing done for retest purposes

## 2020-05-26 NOTE — PROGRESS NOTES
David Hawley  41 y.o. female  1980  [id number]  [id number]     [address] [facility]       Encounter Date: 5/26/2020           Established Patient Visit Note: Dixie Garcia MD    Patient Language: Alana Ribera Requested: patient would like daughter to interpret for her  Interpretor David Crowell) per patient request      Reason for Appointment:  Chief Complaint   Patient presents with    Follow Up Chronic Condition       History of Present Illness:  History provided by patient    David Hawley is a 41 y.o. female who presents today for follow up of chronic conditions and other concerns. She has been prescribed medications in the past, but she is not taking any medications at this time. She reports that a lot has happened in the family over the last six months that has caused her to be upset (\"family problems\"). She reports feeling sad because of this. She also endorses having some Left foot and left hand swelling. Chart review shows that she has been dealing with this for quite some time, and she was previously referred to podiatry for her left foot pain and bunion. She has also noticed some chest pain, dry mouth, and throat swelling associated with her mood symptoms. She reports that sometimes she will feel short of breath sometimes. Chart review shows that the patient had similar symptoms in 2018, and she was referred to cardiology (Dr. George Orona) who performed an Echo stress test at that time with normal results. She also had an EGD that showed a small sliding hiatal hernia and mild erythema of the gastric antrum. NO ulcers or erosions were noted. She had a biopsy that tested positive for H. Pylori, and she was treated with triple therapy. She reports that she has been sad the last few days and feeling emotionally upset. She is also concerned about having some pelvic problems. She went to a doctor a few years ago for this.  She reports that she has pain in this area. Review of Systems  Review of Systems   Constitutional: Negative for chills and fever. Respiratory: Positive for shortness of breath (intermittent a/w mood symptoms). Negative for cough and wheezing. Cardiovascular: Positive for chest pain. Negative for palpitations. Allergies: Patient has no known allergies. Medications: (Updated to reflect final medication list after visit)  No current outpatient medications on file. History  Patient Care Team:  Sandro Franco NP as PCP - General (Nurse Practitioner)  Sandro Franco NP as PCP - [facility]    Past Medical History: she has a past medical history of Depression and Dizziness. Past Surgical History: she has a past surgical history that includes hx hemorrhoidectomy. Family Medical History: family history includes No Known Problems in her father and mother. Social History: she reports that she has never smoked. She has quit using smokeless tobacco. She reports that she does not drink alcohol or use drugs. Objective:   Vital Signs  There were no vitals taken for this visit. Home Vital Signs: 106/47, pulse 90 (wrist cuff)    Physical Exam  Constitutional:       Appearance: Normal appearance. She is well-groomed and normal weight. Eyes:      General: Lids are normal. Vision grossly intact. Gaze aligned appropriately. Conjunctiva/sclera:      Right eye: Right conjunctiva is not injected. Left eye: Left conjunctiva is not injected. Neck:      Musculoskeletal: Full passive range of motion without pain and normal range of motion. Pulmonary:      Effort: Pulmonary effort is normal. No tachypnea, bradypnea, accessory muscle usage or respiratory distress. Skin:     Coloration: Skin is not ashen, cyanotic, jaundiced or mottled. Neurological:      General: No focal deficit present. Mental Status: She is alert. Mental status is at baseline.    Psychiatric:         Attention and Perception: Attention and perception normal.         Mood and Affect: Mood and affect normal.         Speech: Speech normal.         Behavior: Behavior normal. Behavior is cooperative. Assessment & Plan:      ICD-10-CM ICD-9-CM    1. Pelvic pain R10.2 [id number] REFERRAL TO GYNECOLOGY      HCG URINE, QL   2. Localized swelling of left foot R22.42 782.2 URIC ACID      SED RATE (ESR)      RHEUMATOID FACTOR, QL      NT-PRO BNP   3. Screening for hyperlipidemia Z13.220 V77.91 LIPID PANEL      METABOLIC PANEL, COMPREHENSIVE   4. Mood changes R45.86 296.90 TSH 3RD GENERATION   5. Right knee pain, unspecified chronicity M25.561 719.46    6. Chest pain, unspecified type R07.9 786.50 H PYLORI AG, STOOL   7. History of Helicobacter pylori infection Z86.19 V12.09 H PYLORI AG, STOOL     Pelvic Pain: will check labs and provide referral to GYN for further evaluation  Foot Swelling in Setting of Chronic Left Foot Pain: Chronic, uncontrolled; will check labs; and follow up with patient in two weeks  Mood Symptoms: patient with significant mood and physical symptoms in setting of chronic anxiety; will check labs and discuss further at follow up visit  Right Knee Pain in setting of chronic joint pain: Will check labs; consider xray if symptoms persisit  Chest Pain: patient with normal Echo, but EGD with signs of erythema and H pylori on biopsy s/p triple therapy Will recheck H Pylori      I was in the office while conducting this encounter. Consent:  She and/or her healthcare decision maker is aware that this patient-initiated Telehealth encounter is a billable service, with coverage as determined by her insurance carrier. She is aware that she may receive a bill and has provided verbal consent to proceed: Yes    This virtual visit was conducted via [employer].   Pursuant to the emergency declaration under the [address] [place] [employer] [place] waiver authority and the [employer] Appropriations Act, this Virtual  Visit was conducted to reduce the patient's risk of exposure to COVID-19 and provide continuity of care for an established patient. Services were provided through a video synchronous discussion virtually to substitute for in-person clinic visit. Due to this being a TeleHealth evaluation, many elements of the physical examination are unable to be assessed. Total Time: minutes: 11-20 minutes. I have discussed the diagnosis with the patient and the intended plan as seen in the above orders. The patient has received an after-visit summary along with patient information handout. I have discussed medication side effects and warnings with the patient as well. Disposition  Follow-up and Dispositions    · Return in about 2 weeks (around 6/9/2020).            Patricia Barton MD

## 2020-05-28 LAB
ALBUMIN SERPL-MCNC: 4 G/DL (ref 3.8–4.8)
ALBUMIN/GLOB SERPL: 1.8 {RATIO} (ref 1.2–2.2)
ALP SERPL-CCNC: 78 IU/L (ref 39–117)
ALT SERPL-CCNC: 15 IU/L (ref 0–32)
ANA SER QL: NEGATIVE
AST SERPL-CCNC: 8 IU/L (ref 0–40)
BILIRUB SERPL-MCNC: 0.7 MG/DL (ref 0–1.2)
BUN SERPL-MCNC: 10 MG/DL (ref 6–24)
BUN/CREAT SERPL: 16 (ref 9–23)
CALCIUM SERPL-MCNC: 9.2 MG/DL (ref 8.7–10.2)
CHLORIDE SERPL-SCNC: 103 MMOL/L (ref 96–106)
CHOLEST SERPL-MCNC: 178 MG/DL (ref 100–199)
CO2 SERPL-SCNC: 23 MMOL/L (ref 20–29)
CREAT SERPL-MCNC: 0.63 MG/DL (ref 0.57–1)
ERYTHROCYTE [SEDIMENTATION RATE] IN BLOOD BY WESTERGREN METHOD: 16 MM/HR (ref 0–32)
GLOBULIN SER CALC-MCNC: 2.2 G/DL (ref 1.5–4.5)
GLUCOSE SERPL-MCNC: 105 MG/DL (ref 65–99)
HDLC SERPL-MCNC: 39 MG/DL
INTERPRETATION, 910389: NORMAL
LDLC SERPL CALC-MCNC: 109 MG/DL (ref 0–99)
NT-PROBNP SERPL-MCNC: 25 PG/ML (ref 0–130)
POTASSIUM SERPL-SCNC: 4.4 MMOL/L (ref 3.5–5.2)
PROT SERPL-MCNC: 6.2 G/DL (ref 6–8.5)
RHEUMATOID FACT SERPL-ACNC: <10 IU/ML (ref 0–13.9)
SODIUM SERPL-SCNC: 141 MMOL/L (ref 134–144)
TRIGL SERPL-MCNC: 150 MG/DL (ref 0–149)
TSH SERPL DL<=0.005 MIU/L-ACNC: 10.07 UIU/ML (ref 0.45–4.5)
URATE SERPL-MCNC: 5.9 MG/DL (ref 2.5–7.1)
VLDLC SERPL CALC-MCNC: 30 MG/DL (ref 5–40)

## 2020-05-28 NOTE — PROGRESS NOTES
Please call patient to let her know that her thyroid labs are elevated. We will discuss her labs and additional recommendations further at her follow up visit.

## 2020-05-31 LAB — H PYLORI AG STL QL IA: POSITIVE

## 2020-06-01 ENCOUNTER — VIRTUAL VISIT (OUTPATIENT)
Dept: FAMILY MEDICINE CLINIC | Age: 40
End: 2020-06-01

## 2020-06-01 DIAGNOSIS — A04.8 H. PYLORI INFECTION: ICD-10-CM

## 2020-06-01 DIAGNOSIS — R79.89 ELEVATED TSH: ICD-10-CM

## 2020-06-01 DIAGNOSIS — R10.2 PELVIC PAIN: ICD-10-CM

## 2020-06-01 DIAGNOSIS — M79.605 LEFT LEG PAIN: ICD-10-CM

## 2020-06-01 DIAGNOSIS — R07.0 THROAT PAIN: ICD-10-CM

## 2020-06-01 DIAGNOSIS — M54.2 NECK PAIN: ICD-10-CM

## 2020-06-01 DIAGNOSIS — R06.02 SHORTNESS OF BREATH: Primary | ICD-10-CM

## 2020-06-01 RX ORDER — CLARITHROMYCIN 500 MG/1
500 TABLET, FILM COATED ORAL 2 TIMES DAILY
Qty: 28 TAB | Refills: 0 | Status: SHIPPED | OUTPATIENT
Start: 2020-06-01 | End: 2020-06-03 | Stop reason: SDUPTHER

## 2020-06-01 RX ORDER — PANTOPRAZOLE SODIUM 40 MG/1
40 TABLET, DELAYED RELEASE ORAL 2 TIMES DAILY
Qty: 28 TAB | Refills: 0 | Status: SHIPPED | OUTPATIENT
Start: 2020-06-01 | End: 2020-06-01

## 2020-06-01 RX ORDER — AMOXICILLIN 500 MG/1
1000 CAPSULE ORAL 2 TIMES DAILY
Qty: 56 CAP | Refills: 0 | Status: SHIPPED | OUTPATIENT
Start: 2020-06-01 | End: 2020-06-03 | Stop reason: SDUPTHER

## 2020-06-01 RX ORDER — PANTOPRAZOLE SODIUM 40 MG/1
40 TABLET, DELAYED RELEASE ORAL 2 TIMES DAILY
Qty: 28 TAB | Refills: 0 | Status: SHIPPED | OUTPATIENT
Start: 2020-06-01 | End: 2020-06-03 | Stop reason: SDUPTHER

## 2020-06-01 RX ORDER — AMOXICILLIN 500 MG/1
1000 CAPSULE ORAL 2 TIMES DAILY
Qty: 56 CAP | Refills: 0 | Status: SHIPPED | OUTPATIENT
Start: 2020-06-01 | End: 2020-06-01

## 2020-06-01 RX ORDER — CLARITHROMYCIN 500 MG/1
500 TABLET, FILM COATED ORAL 2 TIMES DAILY
Qty: 28 TAB | Refills: 0 | Status: SHIPPED | OUTPATIENT
Start: 2020-06-01 | End: 2020-06-01

## 2020-06-01 NOTE — PROGRESS NOTES
Kyra Hamlin  41 y.o. female  1980  [address] [address] [facility]  [id number]     [facility]       Encounter Date: 6/1/2020           Established Patient Visit Note: Geeta Herman MD    Reason for Appointment:  Chief Complaint   Patient presents with    Follow-up     Patient Language: Bethany Hairston Requested: patient would like daughter to interpret for her  Interpretor Kika Sierra) per patient request    History of Present Illness:  History provided by patient    Kyra Hamlin is a 41 y.o. female who presents today for:    See Visit note from 5/26/20 for full problem history    -Dyspnea. Today she reports that during the nighttime she will feel out of breath over the last several months. She denies any dyspnea at this time, but she does get short of breath with exertion.     -Left Foot and Hand Swelling: Symptoms present for several years. Left Venous duplex from 2015 wnl. Patient also had normal stress echo in March, 2018. Today, the patient reports that swelling has improved, but she continues to have pain in the area. Recent Labs showed normal KHALIDA, Uric Acid, ESR, and RF.     - Pelvic Pain: visit with GYN pending    - Throat/Neck Pain: patient c/o chronic pain in her throat and neck. Chart Review shows that patient was seen emergently in March, 2019 for c/o foreign body swallowed after choking on a piece of chicken. She had an xray that showed prevertebral soft tissue swelling and soft tissue emphysema, and CT scan was recommended. The patient was advised to go to ER. Recrods show show that patient chose to go to [facility], but it is not clear whether she had the CT scan that was recommended. Patient continues to c/o chronic throat pain. Of note, patient was also recently noted to have elevated TSH on recent labs.      -Leg and Hand Pain: she reports that the swelling has improved, but the pain in her leg and arm is still present    H. Pylori Infection: Patient previously treated in Nov, 2018 for H. Pylori infection, but recent stool testing shows that patient continues to have infection. Review of Systems  Review of Systems   Constitutional: Negative for chills and fever. Eyes: Negative for blurred vision. Cardiovascular: Negative for chest pain and palpitations. Gastrointestinal: Positive for abdominal pain. Allergies: Patient has no known allergies. Medications: (Updated to reflect final medication list after visit)    Current Outpatient Medications:     amoxicillin (AMOXIL) 500 mg capsule, Take 2 Caps by mouth two (2) times a day for 14 days. , Disp: 56 Cap, Rfl: 0    clarithromycin (BIAXIN) 500 mg tablet, Take 1 Tab by mouth two (2) times a day for 14 days. , Disp: 28 Tab, Rfl: 0    pantoprazole (PROTONIX) 40 mg tablet, Take 1 Tab by mouth two (2) times a day for 14 days. , Disp: 28 Tab, Rfl: 0    History  Patient Care Team:  Sary Franco NP as PCP - General (Nurse Practitioner)  Sary Franco NP [facility] PCP - [facility]    Past Medical History: she has a past medical history of Depression and Dizziness. Past Surgical History: she has a past surgical history that includes hx hemorrhoidectomy. Family Medical History: family history includes No Known Problems in her father and mother. Social History: she reports that she has never smoked. She has quit using smokeless tobacco. She reports that she does not drink alcohol or use drugs. Objective:   Vital Signs  There were no vitals taken for this visit. Unable to obtain full set of vital signs today as patient does not have all equipment for this at home    Physical Exam  Constitutional:       Appearance: Normal appearance. She is well-groomed. She is obese. Eyes:      General: Lids are normal. Vision grossly intact. Gaze aligned appropriately. Conjunctiva/sclera:      Right eye: Right conjunctiva is not injected.       Left eye: Left conjunctiva is not injected. Neck:      Musculoskeletal: Full passive range of motion without pain and normal range of motion. Pulmonary:      Effort: Pulmonary effort is normal. No tachypnea, bradypnea, accessory muscle usage or respiratory distress. Skin:     Coloration: Skin is not ashen, cyanotic, jaundiced or mottled. Neurological:      General: No focal deficit present. Mental Status: She is alert. Mental status is at baseline. Psychiatric:         Attention and Perception: Attention and perception normal.         Mood and Affect: Mood and affect normal.         Speech: Speech normal.         Behavior: Behavior normal. Behavior is cooperative. Assessment & Plan:      ICD-10-CM ICD-9-CM    1. Shortness of breath R06.02 786.05 XR CHEST PA LAT      CBC WITH AUTOMATED DIFF   2. Left leg pain M79.605 729.5    3. Pelvic pain R10.2 [id number]    4. Elevated TSH R79.89 794.5 TSH AND FREE T4      THYROID ANTIBODY PANEL      US THYROID/PARATHYROID/SOFT TISS   5. Neck pain M54.2 723.1 XR NECK SOFT TISSUE   6. Throat pain R07.0 784.1    7. H. pylori infection A04.8 041.86 amoxicillin (AMOXIL) 500 mg capsule      clarithromycin (BIAXIN) 500 mg tablet      pantoprazole (PROTONIX) 40 mg tablet     -Shortness of Breath: Chronic, unclear etiology. Recent BNP wnl. Will obtain CXR to evaluate further. Patient with normal stress echo in March, 2018. Advised patient to follow up with her cardiologist to discuss need for additional evaluation.   - Left Leg and Arm Pain: Chronic, unclear etiology; continue to monitor  - Pelvic Pain: schedule follow up with GYN as referred  - Elevated TSH: New problem; will check additional labs and obtain thyroid US given patient c/o of chronic f neck/throat pain  - Neck/Throat Pain: Chart review shows this to likely be chronic; given prior abnormal Xray will recheck soft tissue XR of neck  - H. Pylori Infection: Chronic, uncontrolled.  Recent labs show patient continues to have infection. Will treat with triple therapy. Advised patient to schedule follow up with her gastroenterologist.     I was in the office while conducting this encounter. Consent:  She and/or her healthcare decision maker is aware that this patient-initiated Telehealth encounter is a billable service, with coverage as determined by her insurance carrier. She is aware that she may receive a bill and has provided verbal consent to proceed: Yes    This virtual visit was conducted via [employer]. Pursuant to the emergency declaration under the [facility], [facility] waiver authority and the [employer] Act, this Virtual  Visit was conducted to reduce the patient's risk of exposure to COVID-19 and provide continuity of care for an established patient. Services were provided through a video synchronous discussion virtually to substitute for in-person clinic visit. Due to this being a TeleHealth evaluation, many elements of the physical examination are unable to be assessed. Total Time: minutes: 21-30 minutes. I have discussed the diagnosis with the patient and the intended plan as seen in the above orders. The patient has received an after-visit summary along with patient information handout. I have discussed medication side effects and warnings with the patient as well. Disposition  Follow-up and Dispositions    · Return in about 1 week (around 6/8/2020).            Desmond Keys MD

## 2020-06-03 ENCOUNTER — HOSPITAL ENCOUNTER (OUTPATIENT)
Dept: GENERAL RADIOLOGY | Age: 40
Discharge: HOME | End: 2020-06-03
Attending: FAMILY MEDICINE
Payer: SELF-PAY

## 2020-06-03 ENCOUNTER — HOSPITAL ENCOUNTER (OUTPATIENT)
Dept: ULTRASOUND IMAGING | Age: 40
Discharge: HOME | End: 2020-06-03
Attending: FAMILY MEDICINE
Payer: SELF-PAY

## 2020-06-03 DIAGNOSIS — A04.8 H. PYLORI INFECTION: ICD-10-CM

## 2020-06-03 DIAGNOSIS — R06.02 SHORTNESS OF BREATH: ICD-10-CM

## 2020-06-03 DIAGNOSIS — R79.89 ELEVATED TSH: ICD-10-CM

## 2020-06-03 DIAGNOSIS — M54.2 NECK PAIN: ICD-10-CM

## 2020-06-03 PROCEDURE — 70360 X-RAY EXAM OF NECK: CPT

## 2020-06-03 PROCEDURE — 76536 US EXAM OF HEAD AND NECK: CPT

## 2020-06-03 PROCEDURE — 71046 X-RAY EXAM CHEST 2 VIEWS: CPT

## 2020-06-03 RX ORDER — CLARITHROMYCIN 500 MG/1
500 TABLET, FILM COATED ORAL 2 TIMES DAILY
Qty: 28 TAB | Refills: 0 | Status: SHIPPED | OUTPATIENT
Start: 2020-06-03 | End: 2020-06-17

## 2020-06-03 RX ORDER — AMOXICILLIN 500 MG/1
1000 CAPSULE ORAL 2 TIMES DAILY
Qty: 56 CAP | Refills: 0 | Status: SHIPPED | OUTPATIENT
Start: 2020-06-03 | End: 2020-06-17

## 2020-06-03 RX ORDER — PANTOPRAZOLE SODIUM 40 MG/1
40 TABLET, DELAYED RELEASE ORAL 2 TIMES DAILY
Qty: 28 TAB | Refills: 0 | Status: SHIPPED | OUTPATIENT
Start: 2020-06-03 | End: 2020-06-17

## 2020-06-03 NOTE — PROGRESS NOTES
Please call patient to let her know that her neck and chest xray are normal. We will follow up with her regarding the remainder of her studies once they are performed.

## 2020-06-08 ENCOUNTER — VIRTUAL VISIT (OUTPATIENT)
Dept: FAMILY MEDICINE CLINIC | Age: 40
End: 2020-06-08

## 2020-06-08 DIAGNOSIS — R06.83 SNORING: ICD-10-CM

## 2020-06-08 DIAGNOSIS — R10.2 PELVIC PAIN: ICD-10-CM

## 2020-06-08 DIAGNOSIS — R06.02 SHORTNESS OF BREATH: Primary | ICD-10-CM

## 2020-06-08 DIAGNOSIS — R79.89 ELEVATED TSH: ICD-10-CM

## 2020-06-08 DIAGNOSIS — A04.8 H. PYLORI INFECTION: ICD-10-CM

## 2020-06-08 DIAGNOSIS — J30.89 ENVIRONMENTAL AND SEASONAL ALLERGIES: ICD-10-CM

## 2020-06-08 DIAGNOSIS — M79.605 LEFT LEG PAIN: ICD-10-CM

## 2020-06-08 DIAGNOSIS — R07.0 THROAT PAIN: ICD-10-CM

## 2020-06-08 RX ORDER — MINERAL OIL
180 ENEMA (ML) RECTAL
Qty: 30 TAB | Refills: 0 | Status: SHIPPED | OUTPATIENT
Start: 2020-06-08 | End: 2020-07-10 | Stop reason: SDUPTHER

## 2020-06-08 NOTE — PROGRESS NOTES
Abelardo Sherwood  41 y.o. female  1980  [address] [address] [facility]  [id number]     [facility]       Encounter Date: 6/8/2020           Established Patient Visit Note: Kaylee Washington MD    Reason for Appointment:  Chief Complaint   Patient presents with    Follow-up       History of Present Illness:  History provided by patient and daughter    Abelardo Sherwood is a 41 y.o. female who presents today for:    -Dyspnea  PFT: not yet performed  CXR (6/3/2020) no acute process  Cardiology: patient evaluated with normal stress echo in March, 2018  Interval History: patient reports that it will happen when taking a walk she noticed that her chest was really tight; she reports having to stop and take a deep breath. She has also noticed that when sleeping she will snore and stop breathing during the night. She has not had a sleep study. She reports that she also has allergy symptoms after being outside.     - Left Foot and Left Hand Pain/Swelling:  Interval History: She reports that the pain will go on and off and she reports that she is not having any swelling at this time    - Pelvic Pain:   Interval History: She has not yet scheduled visit with obgyn    -Throat/Neck Pain  Xray of Neck: no acute process  Interval History: reports that mouth feels dry and she has some allergies, she reports that her symptoms are mainly related to being out side. She reports that it feels like her symptoms are inside her throat.      - H Pylori Infection  - Specialist: she reports having virtual visit with GI who recommended taking medicine as prescribed  - Interval History: patient prescribed triple therapy after last visit, but she has not yet taken it    - Elevated TSH:   Thyroid US: (6/1/2020): Normal thyroid gland  Labs: TSH 10.070  Interval History: Repeat labs ordered after last visit, but she has not yet had these performed      Review of Systems  Review of Systems Constitutional: Negative for chills and fever. Respiratory: Negative for cough, shortness of breath and wheezing. Cardiovascular: Negative for chest pain and palpitations. Allergies: Patient has no known allergies. Medications: (Updated to reflect final medication list after visit)    Current Outpatient Medications:     fexofenadine (ALLEGRA) 180 mg tablet, Take 1 Tab by mouth daily as needed for Allergies. , Disp: 30 Tab, Rfl: 0    amoxicillin (AMOXIL) 500 mg capsule, Take 2 Caps by mouth two (2) times a day for 14 days. , Disp: 56 Cap, Rfl: 0    clarithromycin (BIAXIN) 500 mg tablet, Take 1 Tab by mouth two (2) times a day for 14 days. , Disp: 28 Tab, Rfl: 0    pantoprazole (PROTONIX) 40 mg tablet, Take 1 Tab by mouth two (2) times a day for 14 days. , Disp: 28 Tab, Rfl: 0    History  Patient Care Team:  Onel Franco NP as PCP - General (Nurse Practitioner)  Onel Franco NP as PCP - [facility]    Past Medical History: she has a past medical history of Depression and Dizziness. Past Surgical History: she has a past surgical history that includes hx hemorrhoidectomy. Family Medical History: family history includes No Known Problems in her father and mother. Social History: she reports that she has never smoked. She has quit using smokeless tobacco. She reports that she does not drink alcohol or use drugs. Objective:   Vital Signs  /49  Unable to obtain full set of vital signs today as patient does not have all equipment for this at home    Physical Exam  Constitutional:       Appearance: Normal appearance. She is well-groomed and normal weight. Eyes:      General: Lids are normal. Vision grossly intact. Gaze aligned appropriately. Conjunctiva/sclera:      Right eye: Right conjunctiva is not injected. Left eye: Left conjunctiva is not injected. Neck:      Musculoskeletal: Full passive range of motion without pain and normal range of motion. Pulmonary:      Effort: Pulmonary effort is normal. No tachypnea, bradypnea, accessory muscle usage or respiratory distress. Skin:     Coloration: Skin is not ashen, cyanotic, jaundiced or mottled. Neurological:      General: No focal deficit present. Mental Status: She is alert. Mental status is at baseline. Psychiatric:         Attention and Perception: Attention and perception normal.         Mood and Affect: Mood and affect normal.         Speech: Speech normal.         Behavior: Behavior normal. Behavior is cooperative. Assessment & Plan:      ICD-10-CM ICD-9-CM    1. Shortness of breath R06.02 786.05 PULMONARY FUNCTION TEST   2. Snoring R06.83 786.09 SLEEP MEDICINE REFERRAL   3. Environmental and seasonal allergies J30.89 477.8 fexofenadine (ALLEGRA) 180 mg tablet   4. Left leg pain M79.605 729.5    5. Pelvic pain R10.2 [id number]    6. Throat pain R07.0 784.1    7. H. pylori infection A04.8 041.86    8. Elevated TSH R79.89 794.5      -Dyspnea: Chronic, unclear etiology: CXR wnl, BNP wnl. Cardiac evaluation in 2018 wnl, but discussed scheduling follow up with cardiology. Given that symptoms occur outside, will check PFTs. Also given that patient endorses snoring and her daughter has noticed that she stops breathing during sleep, will provide referral to sleep medicine for ZACH evaluation.   - Allergies: Patient with symptoms c/f Environmental and Seasonal Allergies; will initiate treatment with allegra. - Leg Pain: improved, continue to monitor  - Pelvic Pain: follow up with OBGYN as referred  - Throat Pain: Neck US and Neck Xray wnl, patient endorses that symptoms feel like they are in her throat; will schedule patient for in-office evaluation to evaluate further  - H.  Pylori Infection: start antibiotics as previously prescribed  - Elevated TSH: will check labs as previously ordered and consider starting synthroid if labs c/w hypothyroidism      I was in the office while conducting this encounter. Consent:  She and/or her healthcare decision maker is aware that this patient-initiated Telehealth encounter is a billable service, with coverage as determined by her insurance carrier. She is aware that she may receive a bill and has provided verbal consent to proceed: Yes    This virtual visit was conducted via [employer]. Pursuant to the emergency declaration under the [facility] [place] [address] [facility] authority and the [employer] Act, this Virtual  Visit was conducted to reduce the patient's risk of exposure to COVID-19 and provide continuity of care for an established patient. Services were provided through a video synchronous discussion virtually to substitute for in-person clinic visit. Due to this being a TeleHealth evaluation, many elements of the physical examination are unable to be assessed. Total Time: minutes: 21-30 minutes. I have discussed the diagnosis with the patient and the intended plan as seen in the above orders. The patient has received an after-visit summary along with patient information handout. I have discussed medication side effects and warnings with the patient as well. Disposition  Follow-up and Dispositions    · Return in about 2 weeks (around 6/22/2020).            Chuyita Gonzalez MD

## 2020-06-12 LAB
BASOPHILS # BLD AUTO: 0 X10E3/UL (ref 0–0.2)
BASOPHILS NFR BLD AUTO: 0 %
EOSINOPHIL # BLD AUTO: 0.1 X10E3/UL (ref 0–0.4)
EOSINOPHIL NFR BLD AUTO: 2 %
ERYTHROCYTE [DISTWIDTH] IN BLOOD BY AUTOMATED COUNT: 13.6 % (ref 11.7–15.4)
HCT VFR BLD AUTO: 38 % (ref 34–46.6)
HGB BLD-MCNC: 12.7 G/DL (ref 11.1–15.9)
IMM GRANULOCYTES # BLD AUTO: 0 X10E3/UL (ref 0–0.1)
IMM GRANULOCYTES NFR BLD AUTO: 0 %
LYMPHOCYTES # BLD AUTO: 2.9 X10E3/UL (ref 0.7–3.1)
LYMPHOCYTES NFR BLD AUTO: 42 %
MCH RBC QN AUTO: 28.1 PG (ref 26.6–33)
MCHC RBC AUTO-ENTMCNC: 33.4 G/DL (ref 31.5–35.7)
MCV RBC AUTO: 84 FL (ref 79–97)
MONOCYTES # BLD AUTO: 0.6 X10E3/UL (ref 0.1–0.9)
MONOCYTES NFR BLD AUTO: 8 %
NEUTROPHILS # BLD AUTO: 3.3 X10E3/UL (ref 1.4–7)
NEUTROPHILS NFR BLD AUTO: 48 %
PLATELET # BLD AUTO: 241 X10E3/UL (ref 150–450)
RBC # BLD AUTO: 4.52 X10E6/UL (ref 3.77–5.28)
T4 FREE SERPL-MCNC: 0.9 NG/DL (ref 0.82–1.77)
THYROGLOB AB SERPL-ACNC: <1 IU/ML (ref 0–0.9)
THYROPEROXIDASE AB SERPL-ACNC: 7 IU/ML (ref 0–34)
TSH SERPL DL<=0.005 MIU/L-ACNC: 8.14 UIU/ML (ref 0.45–4.5)
WBC # BLD AUTO: 6.9 X10E3/UL (ref 3.4–10.8)

## 2020-06-15 NOTE — PROGRESS NOTES
Please call patient to let her know that her thyroid labs continue to be a little elevated. The remainder of her labs are normal. We will discuss her lab results and additional recommendations and her follow up visit.

## 2020-06-16 ENCOUNTER — TELEPHONE (OUTPATIENT)
Dept: CARDIOLOGY CLINIC | Age: 40
End: 2020-06-16

## 2020-06-16 NOTE — TELEPHONE ENCOUNTER
Patient's daughter requesting to schedule an appt with Dr. Pauline Quintana. Dx. Chest pains on/off.  Please advise      [facility]:[phone]

## 2020-06-17 NOTE — TELEPHONE ENCOUNTER
Marylen Penman You 2 minutes ago (9:15 AM)      Patient is scheduled on 6/29/20 @ 11:20am. Patient's daughter stated that it was not urgent and was not interested in a VV and to schedule for first available in-office visit.

## 2020-06-18 NOTE — PROGRESS NOTES
[address]: [place]  [phone]) [phone]    HPI:  Ms. Justino Caruso is a 42 yo Farsi speaking female with morbid obesity, family history of early coronary artery disease, depression seen in the past for chest pains. On her last visit due to unstable angina, had her obtain a stress test in 2018 that was normal. She is here now due to new symptoms of chest discomfort that can happen almost daily, lasting several minutes to hours at a time, can radiate to her left arm and back, can happen with rest or exertion. She does use her left arm a lot when she is cooking. Her daughter is present to translate per patient preference. She is compensated on exam with clear lungs. She does have some baseline chronic lower extremity edema, but this is unchanged. Her blood pressure is 130/80 with a heart rate of 79. EKG here was normal sinus rhythm and nonspecific ST-T wave abnormality. There is poor R-wave progression, but this was unchanged from her previous EKG in 2018 and at that time her ejection fraction was normal consistent with her EKG being a normal variant. Soc hx. No tobacco  Fam hx. Brother CAD, 36. Assessment and Plan:    1. Unstable angina. Chest pain and shortness of breath with atypical features and multiple risk factors. Will proceed with a stress test.  She cannot fully complete a treadmill and will do this Lexiscan. If this is unrevealing, would consider musculoskeletal or GI etiology. 2. Family history of early coronary artery disease. 3. Morbid obesity. 4. Gastroesophageal reflux disease. She  has a past medical history of Depression and Dizziness. All other systems negative except as above. PE  Vitals:    06/29/20 1150   BP: 130/80   Pulse: 79   Resp: 16   SpO2: 98%   Weight: 260 lb 12.8 oz (118.3 kg)   Height: 5' (1.524 m)    Body mass index is 50.93 kg/m².    General appearance - alert, well appearing, and in no distress  Mental status - affect appropriate to mood  Eyes - sclera anicteric, moist mucous membranes  Neck - supple, no JVD  Chest - clear to auscultation, no wheezes, rales or rhonchi  Heart - normal rate, regular rhythm, normal S1, S2, no murmurs, rubs, clicks or gallops  Abdomen - soft, nontender, nondistended, no masses or organomegaly  Neurological -no focal deficit  Extremities - peripheral pulses normal, no pedal edema      Recent Labs:  Lab Results   Component Value Date/Time    Cholesterol, total 178 05/27/2020 10:08 AM    HDL Cholesterol 39 (L) 05/27/2020 10:08 AM    LDL, calculated 109 (H) 05/27/2020 10:08 AM    Triglyceride 150 (H) 05/27/2020 10:08 AM     Lab Results   Component Value Date/Time    Creatinine 0.63 05/27/2020 10:08 AM     Lab Results   Component Value Date/Time    BUN 10 05/27/2020 10:08 AM     Lab Results   Component Value Date/Time    Potassium 4.4 05/27/2020 10:08 AM     Lab Results   Component Value Date/Time    Hemoglobin A1c 5.3 05/16/2018 12:50 PM     Lab Results   Component Value Date/Time    HGB 12.7 06/11/2020 03:05 PM     Lab Results   Component Value Date/Time    PLATELET 893 75/86/0507 03:05 PM       Reviewed:  Past Medical History:   Diagnosis Date    Depression     Dizziness      Social History     Tobacco Use   Smoking Status Never Smoker   Smokeless Tobacco Former User   Tobacco Comment    hookah      Social History     Substance and Sexual Activity   Alcohol Use No     No Known Allergies    Current Outpatient Medications   Medication Sig    pantoprazole (PROTONIX) 40 mg tablet Take 40 mg by mouth two (2) times a day.  amoxicillin (AMOXIL) 500 mg capsule TAKE 2 CAPSULES BY MOUTH TWO (2) TIMES A DAY FOR 14 DAYS.  clarithromycin (BIAXIN) 500 mg tablet TAKE 1 TABLET BY MOUTH TWO (2) TIMES A DAY FOR 14 DAYS.  fexofenadine (ALLEGRA) 180 mg tablet Take 1 Tab by mouth daily as needed for Allergies.     gemfibroziL (LOPID) 600 mg tablet 600 mg.    omeprazole (PRILOSEC) 40 mg capsule 40 mg.    levothyroxine (SYNTHROID) 25 mcg tablet Take 1 Tab by mouth daily.  ciprofloxacin-dexamethasone (CIPRODEX) 0.3-0.1 % otic suspension Administer 4 Drops in left ear two (2) times a day for 7 days. No current facility-administered medications for this visit.         Dorita Anderson MD  [facility]  [address] [facility] [address]  [facility], [address] [place] [address]

## 2020-06-24 ENCOUNTER — OFFICE VISIT (OUTPATIENT)
Dept: FAMILY MEDICINE CLINIC | Age: 40
End: 2020-06-24

## 2020-06-24 VITALS
BODY MASS INDEX: 51.63 KG/M2 | RESPIRATION RATE: 18 BRPM | SYSTOLIC BLOOD PRESSURE: 122 MMHG | DIASTOLIC BLOOD PRESSURE: 78 MMHG | TEMPERATURE: 99.8 F | OXYGEN SATURATION: 95 % | HEART RATE: 71 BPM | WEIGHT: 263 LBS | HEIGHT: 60 IN

## 2020-06-24 DIAGNOSIS — M79.605 LEFT LEG PAIN: ICD-10-CM

## 2020-06-24 DIAGNOSIS — R10.2 PELVIC PAIN: ICD-10-CM

## 2020-06-24 DIAGNOSIS — H60.502 ACUTE OTITIS EXTERNA OF LEFT EAR, UNSPECIFIED TYPE: ICD-10-CM

## 2020-06-24 DIAGNOSIS — E03.9 ACQUIRED HYPOTHYROIDISM: Primary | ICD-10-CM

## 2020-06-24 DIAGNOSIS — A04.8 H. PYLORI INFECTION: ICD-10-CM

## 2020-06-24 DIAGNOSIS — E78.5 HYPERLIPIDEMIA LDL GOAL <100: ICD-10-CM

## 2020-06-24 DIAGNOSIS — R06.02 SHORTNESS OF BREATH: ICD-10-CM

## 2020-06-24 DIAGNOSIS — R07.0 THROAT PAIN: ICD-10-CM

## 2020-06-24 RX ORDER — CIPROFLOXACIN AND DEXAMETHASONE 3; 1 MG/ML; MG/ML
4 SUSPENSION/ DROPS AURICULAR (OTIC) 2 TIMES DAILY
Qty: 7.5 ML | Refills: 0 | Status: SHIPPED | OUTPATIENT
Start: 2020-06-24 | End: 2020-07-01

## 2020-06-24 RX ORDER — AMOXICILLIN 500 MG/1
CAPSULE ORAL
COMMUNITY
Start: 2020-06-18 | End: 2021-05-04 | Stop reason: ALTCHOICE

## 2020-06-24 RX ORDER — OMEPRAZOLE 40 MG/1
40 CAPSULE, DELAYED RELEASE ORAL
COMMUNITY
End: 2021-05-04 | Stop reason: ALTCHOICE

## 2020-06-24 RX ORDER — LEVOTHYROXINE SODIUM 25 UG/1
25 TABLET ORAL DAILY
Qty: 90 TAB | Refills: 0 | Status: SHIPPED | OUTPATIENT
Start: 2020-06-24 | End: 2020-09-21 | Stop reason: SDUPTHER

## 2020-06-24 RX ORDER — CLARITHROMYCIN 500 MG/1
TABLET, FILM COATED ORAL
COMMUNITY
Start: 2020-06-18 | End: 2021-05-04 | Stop reason: ALTCHOICE

## 2020-06-24 RX ORDER — GEMFIBROZIL 600 MG/1
600 TABLET, FILM COATED ORAL
COMMUNITY
End: 2021-11-22 | Stop reason: ALTCHOICE

## 2020-06-24 NOTE — PROGRESS NOTES
Gutierrez Quiroz  41 y.o. female  1980  [address]  [id number]     [facility]       Encounter Date: 6/24/2020           Established Patient Visit Note: Vonnie Pack MD    Patient Language: Meri Cruz Requested: patient prefers to have her daughter interpret      Reason for Appointment:  Chief Complaint   Patient presents with    Follow-up     2 week        History of Present Illness:  History provided by patient    Gutierrez Quiroz is a 41 y.o. female who presents to clinic today for:    -Dyspnea  PFT: not yet performed  CXR (6/3/2020) no acute process  Labs: BNP 25 (5/27/20), CBC (6/11/20) wnl  Specialist  · Cardiology: patient evaluated with normal stress echo in March, 2018; patient has apt on 6/29/20  · Sleep Medicine: patient has sleep medicine apt scheduled for 8/4/20  Symptoms: patient reports that it will happen when taking a walk she noticed that her chest was really tight; she reports having to stop and take a deep breath. She has also noticed that when sleeping she will snore and stop breathing during the night. She has not had a sleep study. She reports that she also has allergy symptoms after being outside.      - Left Foot/Leg and Left Hand Pain/Swelling:  Labs: (5/27/20) KHALIDA negative, Uric Acid 5.9, ESR and RF wnl,   Interval History: She reports that the pain will go on and off and she reports that she is not having any swelling at this time     - Pelvic Pain:   Interval History: She reports that she saw Dr. Ashely Wiley and everything was reported as normal. She has follow up appointment scheduled soon.      -Throat/Neck Pain  Xray of Neck: (6/1/2020) no acute process  US Thyroid (6/3/20): normal thyroid gland  Interval History: reports that mouth feels dry and she has some allergies, she reports that her symptoms are mainly related to being out side. She reports that it feels like her symptoms are inside her throat.    - H Pylori Infection  - Specialist: she reports having virtual visit with GI who recommended taking medicine as prescribed  - Treatment: patient prescribed triple therapy on 11/16/18, but she did take prescription; repeat testing in  May, 2020 showed that patient had continued infection and triple therapy was again prescribed. Patient was counseled extensively regarding the need to complete therapy, but she has not yet started the medication    - Elevated TSH:   Thyroid US: (6/1/2020): Normal thyroid gland  Labs: (5/26/20) TSH 10.070, (6/1/20)TSH  8.140, freeT4 0.90, TPO and thyroglobulin ab: negative  Interval History: patient reports that she continues to feel fatigued and tired    Hyperlipidemia  Last Lipids: Chol 178, HDL 39, , Tri 150  Medications: none    Ear Itching  Duration: 1-2 weeks  Denies any problems with hearing    Review of Systems  Review of Systems   Constitutional: Negative for chills and fever. Respiratory: Negative for cough, shortness of breath and wheezing. Cardiovascular: Negative for chest pain and palpitations. Allergies: Patient has no known allergies. Medications: (Updated to reflect final medication list after visit)    Current Outpatient Medications:     amoxicillin (AMOXIL) 500 mg capsule, TAKE 2 CAPSULES BY MOUTH TWO (2) TIMES A DAY FOR 14 DAYS., Disp: , Rfl:     clarithromycin (BIAXIN) 500 mg tablet, TAKE 1 TABLET BY MOUTH TWO (2) TIMES A DAY FOR 14 DAYS., Disp: , Rfl:     omeprazole (PRILOSEC) 40 mg capsule, 40 mg., Disp: , Rfl:     levothyroxine (SYNTHROID) 25 mcg tablet, Take 1 Tab by mouth daily. , Disp: 90 Tab, Rfl: 0    ciprofloxacin-dexamethasone (CIPRODEX) 0.3-0.1 % otic suspension, Administer 4 Drops in left ear two (2) times a day for 7 days. , Disp: 7.5 mL, Rfl: 0    fexofenadine (ALLEGRA) 180 mg tablet, Take 1 Tab by mouth daily as needed for Allergies. , Disp: 30 Tab, Rfl: 0    pantoprazole (PROTONIX) 40 mg tablet, Take 40 mg by mouth two (2) times a day., Disp: , Rfl:     gemfibroziL (LOPID) 600 mg tablet, 600 mg., Disp: , Rfl:     History  Patient Care Team:  Onel Franco NP as PCP - General (Nurse Practitioner)  Onel Franco NP as PCP - [facility]    Past Medical History: she has a past medical history of Depression and Dizziness. Past Surgical History: she has a past surgical history that includes hx hemorrhoidectomy. Family Medical History: family history includes No Known Problems in her father and mother. Social History: she reports that she has never smoked. She has quit using smokeless tobacco. She reports that she does not drink alcohol or use drugs. No specialty comments available. There are no preventive care reminders to display for this patient. Objective:   Visit Vitals  /78   Pulse 71   Temp 99.8 °F (37.7 °C)   Resp 18   Ht 5' (1.524 m)   Wt 263 lb (119.3 kg)   LMP 05/27/2020 (Approximate)   SpO2 95%   BMI 51.36 kg/m²     Wt Readings from Last 3 Encounters:   06/29/20 260 lb 12.8 oz (118.3 kg)   06/24/20 263 lb (119.3 kg)   03/02/19 253 lb (114.8 kg)       Physical Exam  Vitals signs and nursing note reviewed. Constitutional:       General: She is not in acute distress. Appearance: Normal appearance. HENT:      Head: Normocephalic and atraumatic. Right Ear: Hearing normal. No decreased hearing noted. No tenderness. No middle ear effusion. There is no impacted cerumen. No hemotympanum. Tympanic membrane is not injected. Left Ear: Hearing normal. No decreased hearing noted. Tenderness present. No middle ear effusion. There is no impacted cerumen. No hemotympanum. Tympanic membrane is not injected. Ears:      Comments: Mild erythema of left external ear canal     Nose: Nose normal.      Mouth/Throat:      Mouth: Mucous membranes are moist.   Eyes:      Extraocular Movements: Extraocular movements intact.       Conjunctiva/sclera: Conjunctivae normal.      Pupils: Pupils are equal, round, and reactive to light. Neck:      Musculoskeletal: Normal range of motion and neck supple. No neck rigidity or muscular tenderness. Cardiovascular:      Rate and Rhythm: Normal rate and regular rhythm. Pulses: Normal pulses. Heart sounds: Normal heart sounds. No murmur. No friction rub. No gallop. Pulmonary:      Effort: Pulmonary effort is normal. No respiratory distress. Breath sounds: Normal breath sounds. No wheezing, rhonchi or rales. Musculoskeletal: Normal range of motion. Lymphadenopathy:      Cervical: No cervical adenopathy. Skin:     General: Skin is warm. Coloration: Skin is not jaundiced. Neurological:      General: No focal deficit present. Mental Status: She is alert and oriented to person, place, and time. Mental status is at baseline. Cranial Nerves: Cranial nerves are intact. Motor: Motor function is intact. Coordination: Coordination is intact. Psychiatric:         Mood and Affect: Mood normal.         Behavior: Behavior normal.         Thought Content: Thought content normal.         Judgment: Judgment normal.         Assessment & Plan:      ICD-10-CM ICD-9-CM    1. Acquired hypothyroidism E03.9 244.9 levothyroxine (SYNTHROID) 25 mcg tablet   2. H. pylori infection A04.8 041.86    3. Throat pain R07.0 784.1 REFERRAL TO ENT-OTOLARYNGOLOGY   4. Acute otitis externa of left ear, unspecified type H60.502 380.10 ciprofloxacin-dexamethasone (CIPRODEX) 0.3-0.1 % otic suspension   5. Shortness of breath R06.02 786.05    6. Left leg pain M79.605 729.5    7. Pelvic pain R10.2 PUT3140    8. Hyperlipidemia LDL goal <100 E78.5 272.4      · Hypothyroidism: Chronic, uncontrolled. Discussed options. Patient would like to start synthroid, but she would first like to complete treatment for h. Pylori.  Patient agrees to start synthroid once she completes triple therapy  · H pylori: Chronic, patient prescribed treatment, but she has not yet started; discussed importance of adhering to therapy as prescribed  · Throat Pain: chronic, unclear etiology, will provide referral to ENT for further evaluation  · AOE Left Ear: will treat with ciprodex; discussed s/s to monitor and reasons to call  · Dyspnea: follow up with cardiology and sleep medicine as previously referred  · Left Leg Pain: symptoms appear to have resolved at this time; will continue to monitor  · Pelvic Pain: managed by obgyn  · Hyperlipidemia: discussed recommendations for diet and exercise      I have discussed the diagnosis with the patient and the intended plan as seen in the above orders. The patient has received an after-visit summary along with patient information handout. I have discussed medication side effects and warnings with the patient as well. Disposition  Follow-up and Dispositions    · Return in about 4 weeks (around 7/22/2020).            Braeden Gonzalez MD

## 2020-06-24 NOTE — PROGRESS NOTES
Chief Complaint   Patient presents with    Follow-up     2 week      1. Have you been to the ER, urgent care clinic since your last visit? Hospitalized since your last visit? No    2. Have you seen or consulted any other health care providers outside of the [address] [place] [address] since your last visit? Include any pap smears or colon screening.  No

## 2020-06-26 ENCOUNTER — TELEPHONE (OUTPATIENT)
Dept: CARDIOLOGY CLINIC | Age: 40
End: 2020-06-26

## 2020-06-29 ENCOUNTER — OFFICE VISIT (OUTPATIENT)
Dept: CARDIOLOGY CLINIC | Age: 40
End: 2020-06-29

## 2020-06-29 VITALS
BODY MASS INDEX: 51.2 KG/M2 | SYSTOLIC BLOOD PRESSURE: 130 MMHG | HEART RATE: 79 BPM | DIASTOLIC BLOOD PRESSURE: 80 MMHG | HEIGHT: 60 IN | OXYGEN SATURATION: 98 % | RESPIRATION RATE: 16 BRPM | WEIGHT: 260.8 LBS

## 2020-06-29 DIAGNOSIS — R42 DIZZINESS: ICD-10-CM

## 2020-06-29 DIAGNOSIS — Z82.49 FAMILY HISTORY OF EARLY CAD: ICD-10-CM

## 2020-06-29 DIAGNOSIS — I20.0 UNSTABLE ANGINA (HCC): Primary | ICD-10-CM

## 2020-06-29 DIAGNOSIS — E66.01 OBESITY, MORBID (HCC): ICD-10-CM

## 2020-06-29 RX ORDER — PANTOPRAZOLE SODIUM 40 MG/1
40 TABLET, DELAYED RELEASE ORAL 2 TIMES DAILY
COMMUNITY
End: 2021-05-04 | Stop reason: ALTCHOICE

## 2020-06-29 NOTE — PATIENT INSTRUCTIONS
Lexiscan test will be done at [facility] (They will call you with date and time) Follow up with  after test

## 2020-06-30 PROBLEM — H60.502 ACUTE OTITIS EXTERNA OF LEFT EAR: Status: ACTIVE | Noted: 2020-06-30

## 2020-06-30 PROBLEM — R06.02 SHORTNESS OF BREATH: Status: ACTIVE | Noted: 2020-06-30

## 2020-06-30 PROBLEM — Z91.148 NON COMPLIANCE W MEDICATION REGIMEN: Status: ACTIVE | Noted: 2020-06-30

## 2020-06-30 PROBLEM — A04.8 H. PYLORI INFECTION: Status: ACTIVE | Noted: 2020-06-30

## 2020-06-30 PROBLEM — M79.605 LEFT LEG PAIN: Status: ACTIVE | Noted: 2020-06-30

## 2020-06-30 PROBLEM — R07.0 THROAT PAIN: Status: ACTIVE | Noted: 2020-06-30

## 2020-06-30 PROBLEM — Z91.14 NON COMPLIANCE W MEDICATION REGIMEN: Status: ACTIVE | Noted: 2020-06-30

## 2020-06-30 PROBLEM — E78.5 HYPERLIPIDEMIA LDL GOAL <100: Status: ACTIVE | Noted: 2020-06-30

## 2020-06-30 PROBLEM — R10.2 PELVIC PAIN: Status: ACTIVE | Noted: 2020-06-30

## 2020-06-30 PROBLEM — E03.9 ACQUIRED HYPOTHYROIDISM: Status: ACTIVE | Noted: 2020-06-30

## 2020-07-10 DIAGNOSIS — J30.89 ENVIRONMENTAL AND SEASONAL ALLERGIES: ICD-10-CM

## 2020-07-10 RX ORDER — MINERAL OIL
180 ENEMA (ML) RECTAL
Qty: 30 TAB | Refills: 5 | Status: SHIPPED | OUTPATIENT
Start: 2020-07-10 | End: 2021-11-22 | Stop reason: ALTCHOICE

## 2020-07-10 NOTE — TELEPHONE ENCOUNTER
Last Visit: 6/24/20  MD Chiquita Serrano  Next Appointment: Not scheduled  Previous Refill Encounter(s): 6/8/20  #30    Added 5 refills if appropriate. Requested Prescriptions     Pending Prescriptions Disp Refills    fexofenadine (ALLEGRA) 180 mg tablet 30 Tab 5     Sig: Take 1 Tab by mouth daily as needed for Allergies.

## 2020-08-03 ENCOUNTER — TELEPHONE (OUTPATIENT)
Dept: SLEEP MEDICINE | Age: 40
End: 2020-08-03

## 2020-08-03 NOTE — TELEPHONE ENCOUNTER
Unable to reach to confirm VV. LM canceling appointment and asked patient to call back to reschedule.

## 2020-09-21 DIAGNOSIS — E03.9 ACQUIRED HYPOTHYROIDISM: ICD-10-CM

## 2020-09-21 RX ORDER — LEVOTHYROXINE SODIUM 25 UG/1
25 TABLET ORAL DAILY
Qty: 90 TAB | Refills: 1 | Status: SHIPPED | OUTPATIENT
Start: 2020-09-21 | End: 2021-05-07 | Stop reason: SDUPTHER

## 2020-09-21 NOTE — TELEPHONE ENCOUNTER
Last Visit: 6/24/20  MD Rachel Chappell  Next Appointment: Not scheduled  Previous Refill Encounter(s): 6/24/20  90    Requested Prescriptions     Pending Prescriptions Disp Refills    levothyroxine (SYNTHROID) 25 mcg tablet 90 Tab 1     Sig: Take 1 Tab by mouth daily.

## 2021-05-04 ENCOUNTER — OFFICE VISIT (OUTPATIENT)
Dept: FAMILY MEDICINE CLINIC | Age: 41
End: 2021-05-04
Payer: MEDICAID

## 2021-05-04 VITALS
HEART RATE: 86 BPM | WEIGHT: 263.6 LBS | BODY MASS INDEX: 51.75 KG/M2 | DIASTOLIC BLOOD PRESSURE: 76 MMHG | SYSTOLIC BLOOD PRESSURE: 124 MMHG | OXYGEN SATURATION: 97 % | TEMPERATURE: 98.1 F | RESPIRATION RATE: 16 BRPM | HEIGHT: 60 IN

## 2021-05-04 DIAGNOSIS — R10.2 PELVIC PAIN: Primary | ICD-10-CM

## 2021-05-04 DIAGNOSIS — Z13.220 SCREENING, LIPID: ICD-10-CM

## 2021-05-04 DIAGNOSIS — E03.9 ACQUIRED HYPOTHYROIDISM: ICD-10-CM

## 2021-05-04 DIAGNOSIS — R07.89 ATYPICAL CHEST PAIN: ICD-10-CM

## 2021-05-04 DIAGNOSIS — F41.1 GAD (GENERALIZED ANXIETY DISORDER): ICD-10-CM

## 2021-05-04 DIAGNOSIS — A04.8 H. PYLORI INFECTION: ICD-10-CM

## 2021-05-04 DIAGNOSIS — Z87.898 HISTORY OF SNORING: ICD-10-CM

## 2021-05-04 PROCEDURE — 99215 OFFICE O/P EST HI 40 MIN: CPT | Performed by: NURSE PRACTITIONER

## 2021-05-04 NOTE — LETTER
5/4/2021 Ms. Dietrich Thrivent [address] [address] To Whom It May Concern: 
 
Nieves Patton was under the care of [facility]. She has been a patient of mine since 2018. She is unable to speak [place] or read in [place]. I ask that she be excused from the written exam for citizenship as she is not able to read or write in [place]. If there are questions or concerns please have the patient contact our office. Sincerely, Devan Fair NP

## 2021-05-05 LAB
CHOLEST SERPL-MCNC: 183 MG/DL
COMMENT, HOLDF: NORMAL
HDLC SERPL-MCNC: 39 MG/DL
HDLC SERPL: 4.7 {RATIO} (ref 0–5)
LDLC SERPL CALC-MCNC: 95.6 MG/DL (ref 0–100)
LIPID PROFILE,FLP: ABNORMAL
SAMPLES BEING HELD,HOLD: NORMAL
T4 FREE SERPL-MCNC: 0.9 NG/DL (ref 0.8–1.5)
TRIGL SERPL-MCNC: 242 MG/DL (ref ?–150)
TSH SERPL DL<=0.05 MIU/L-ACNC: 12.3 UIU/ML (ref 0.36–3.74)
VLDLC SERPL CALC-MCNC: 48.4 MG/DL

## 2021-05-06 LAB — H PYLORI IGG SER IA-ACNC: 0.32 INDEX VALUE (ref 0–0.79)

## 2021-05-07 DIAGNOSIS — E03.9 ACQUIRED HYPOTHYROIDISM: ICD-10-CM

## 2021-05-07 NOTE — TELEPHONE ENCOUNTER
Last visit 05/04/2021 RIVAS Franco   Next appointment 06/04/2021 RIVAS Franco   Previous refill encounter(s)   09/21/2020 Synthroid #90 with 1 refill     Requested Prescriptions     Pending Prescriptions Disp Refills    levothyroxine (SYNTHROID) 25 mcg tablet 90 Tab 1     Sig: Take 1 Tab by mouth daily.

## 2021-05-07 NOTE — TELEPHONE ENCOUNTER
Patient  daughtercalling to check the status of the mother refill on levothytoxine 25 mcg tablet    Best call back #[phone]

## 2021-05-07 NOTE — TELEPHONE ENCOUNTER
----- Message from Zeke Novoa sent at 5/6/2021  2:33 PM EDT -----  Regarding: Zena Franco, NP/telephone  Medication Refill    Caller (if not patient): Ra Lara      Relationship of caller (if not patient):Allison      Best contact number(s): [phone]      Name of medication and dosage if known: anxiety medication      Is patient out of this medication (yes/no): yes      Pharmacy name: [facility] listed in chart? (yes/no): no    Pharmacy phone number: [phone]      Details to clarify the request: Pt was seen 5/4 @ 11:15 am, the old pharmacy does not have the prescription, pt wants the medication sent to the new pharmacy [address] # [phone]         Zeke Novoa

## 2021-05-09 RX ORDER — LEVOTHYROXINE SODIUM 25 UG/1
25 TABLET ORAL DAILY
Qty: 90 TAB | Refills: 1 | Status: SHIPPED | OUTPATIENT
Start: 2021-05-09 | End: 2021-11-08

## 2021-05-10 RX ORDER — ESCITALOPRAM OXALATE 5 MG/1
5 TABLET ORAL DAILY
Qty: 30 TAB | Refills: 1 | Status: SHIPPED | OUTPATIENT
Start: 2021-05-10 | End: 2021-11-22 | Stop reason: ALTCHOICE

## 2021-05-10 NOTE — PROGRESS NOTES
Assessment/Plan:     Diagnoses and all orders for this visit:    1. Pelvic pain  -     REFERRAL TO OBSTETRICS AND GYNECOLOGY  Given referral again to follow-up with OB/GYN. 2. History of snoring  -     REFERRAL TO PULMONARY DISEASE  Referred to rule out ZACH. 3. Atypical chest pain  -     REFERRAL TO CARDIOLOGY  I have strongly encouraged her to follow through with recommended stress testing. 4. H. pylori infection  -     H PYLORI AB, IGG, QT; Future    5. Acquired hypothyroidism  -     TSH 3RD GENERATION; Future  -     T4, FREE; Future  Has been off treatment for many months. Recently restarted. 6. Screening, lipid  -     LIPID PANEL; Future      7. DOUGLAS (generalized anxiety disorder)  -     escitalopram oxalate (LEXAPRO) 5 mg tablet; Take 1 Tab by mouth daily. Indications: repeated episodes of anxiety    Uncontrolled. Add treatment as above. Follow-up in 4 weeks or sooner as needed. Discussed expected course/resolution/complications of diagnosis in detail with patient. Medication risks/benefits/costs/interactions/alternatives discussed with patient. Pt was given after visit summary which includes diagnoses, current medications & vitals. Pt expressed understanding with the diagnosis and plan          Subjective:      Silvio Sykes is a 35 y.o. female who presents for had concerns including Leg Pain (LT leg pain and swelling ), Vaginal Pain (1 week or 2 week before cycle pt has extreme vaginal pain. also has pain not around the cycle time but happens occasional. feels like a prolapse ), and Shortness of Breath (having SOB and chest pain ). Presents today with her daughter who assist in translation as the patient is Japanese speaking only. Comes with a multitude of concerns. Some of these have been previously evaluated however she did not follow through with recommendations. She continues with chronic pelvic pain. She has been referred to OB/GYN.  She had one visit and some evaluation which was unremarkable. She has not returned at this time. She continues with atypical chest pain and shortness of breath. She was referred current to cardiology who suggested stress testing. She did not follow through with the stress testing due to fear over the adenosine stress test. She is unable to complete a treadmill stress test.    History of H. pylori infection which was treated 1 year ago. She was not retested for clearance. She continues to have acid reflux, epigastric abdominal pain, bloating, belching. She is not currently on treatment. She is not followed by gastroenterology. She has been off treatment for thyroid disease for many months. She did restart several days ago. She is due for labs. There is a history of uncontrolled anxiety disorder. She has previously been hesitant towards treatment however she would like to discuss today. Patient Active Problem List   Diagnosis Code    Dizziness R42    HA (headache) R51.9    Depression F32.9    Obesity, morbid ([facility] [address] E66.01    DOUGLAS (generalized anxiety disorder) F41.1    Acquired hypothyroidism E03.9    H. pylori infection A04.8    Throat pain R07.0    Acute otitis externa of left ear H60.502    Shortness of breath R06.02    Left leg pain M79.605    Pelvic pain R10.2    Hyperlipidemia LDL goal <100 E78.5       Current Outpatient Medications   Medication Sig Dispense Refill    levothyroxine (SYNTHROID) 25 mcg tablet Take 1 Tab by mouth daily. 90 Tab 1    fexofenadine (ALLEGRA) 180 mg tablet Take 1 Tab by mouth daily as needed for Allergies. 30 Tab 5    gemfibroziL (LOPID) 600 mg tablet 600 mg. No Known Allergies    ROS:   Review of Systems   Constitutional: Positive for malaise/fatigue. Negative for fever and weight loss. HENT: Negative for hearing loss. Eyes: Negative for blurred vision and pain. Respiratory: Positive for shortness of breath. Negative for cough.     Cardiovascular: Positive for chest pain and leg swelling. Negative for palpitations. Gastrointestinal: Positive for abdominal pain. Negative for blood in stool, constipation, diarrhea and melena. Genitourinary: Negative for dysuria and hematuria. Musculoskeletal: Negative for joint pain. Skin: Negative for rash. Neurological: Negative for headaches. Psychiatric/Behavioral: Negative for depression. The patient is nervous/anxious. The patient does not have insomnia. Objective:     Visit Vitals  /76 (BP 1 Location: Left lower arm, BP Patient Position: Sitting, BP Cuff Size: Adult)   Pulse 86   Temp 98.1 °F (36.7 °C) (Temporal)   Resp 16   Ht 5' (1.524 m)   Wt 263 lb 9.6 oz (119.6 kg)   LMP 04/22/2021 (Approximate)   SpO2 97%   BMI 51.48 kg/m²       Vitals and Nurse Documentation reviewed. Physical Exam  Constitutional:       General: She is not in acute distress. Appearance: She is obese. Cardiovascular:      Heart sounds: S1 normal and S2 normal. No murmur. No friction rub. No gallop. Pulmonary:      Effort: No respiratory distress. Breath sounds: Normal breath sounds. Skin:     General: Skin is warm and dry. Psychiatric:         Mood and Affect: Affect normal. Mood is anxious. Speech: Speech is rapid and pressured. Behavior: Behavior is agitated and hyperactive.

## 2021-05-14 ENCOUNTER — OFFICE VISIT (OUTPATIENT)
Dept: CARDIOLOGY CLINIC | Age: 41
End: 2021-05-14
Payer: MEDICAID

## 2021-05-14 VITALS
BODY MASS INDEX: 51.63 KG/M2 | HEIGHT: 60 IN | RESPIRATION RATE: 14 BRPM | WEIGHT: 263 LBS | OXYGEN SATURATION: 97 % | SYSTOLIC BLOOD PRESSURE: 130 MMHG | HEART RATE: 90 BPM | DIASTOLIC BLOOD PRESSURE: 90 MMHG

## 2021-05-14 DIAGNOSIS — I20.0 UNSTABLE ANGINA (HCC): Primary | ICD-10-CM

## 2021-05-14 DIAGNOSIS — Z82.49 FAMILY HISTORY OF EARLY CAD: ICD-10-CM

## 2021-05-14 DIAGNOSIS — R42 DIZZINESS: ICD-10-CM

## 2021-05-14 DIAGNOSIS — E66.01 OBESITY, MORBID (HCC): ICD-10-CM

## 2021-05-14 PROCEDURE — 93000 ELECTROCARDIOGRAM COMPLETE: CPT | Performed by: INTERNAL MEDICINE

## 2021-05-14 PROCEDURE — 99214 OFFICE O/P EST MOD 30 MIN: CPT | Performed by: INTERNAL MEDICINE

## 2021-05-14 NOTE — PROGRESS NOTES
[facility] [place]: [place]  ([phone]) [phone]    HPI:  Ms. Darrell Valdes is a 34 yo Farsi speaking female with morbid obesity, family history of early coronary artery disease, depression seen in the past for chest pains. On her last visit due to her chest pain and shortness of breath, typical and atypical features, recommended a stress test.  She had reservations about this and never proceeded with this. She continues with episodes where she will have dyspnea. She has not had too much in terms of chest pain. Her daughter is present, who helps translate per the patient's preference. She is compensated on exam with clear lungs. She does have baseline chronic lower extremity edema, but it is unchanged. Soc hx. No tobacco  Fam hx. Brother CAD, 36. Assessment and Plan:   1. Unstable angina. Chest pain and shortness of breath with typical and atypical features and risk factors; will proceed with a stress test.  She cannot fully complete a treadmill and we will do this Lexiscan. If this is unrevealing would consider musculoskeletal or GI etiology. 2. Family history of early coronary artery disease. 3. Morbid obesity. 4. Gastroesophageal reflux disease. She  has a past medical history of Depression and Dizziness. All other systems negative except as above. PE  Vitals:    05/14/21 0959   BP: (!) 130/90   Pulse: 90   Resp: 14   SpO2: 97%   Weight: 263 lb (119.3 kg)   Height: 5' (1.524 m)    Body mass index is 51.36 kg/m².    General appearance - alert, well appearing, and in no distress  Mental status - affect appropriate to mood  Eyes - sclera anicteric, moist mucous membranes  Neck - supple, no JVD  Chest - clear to auscultation, no wheezes, rales or rhonchi  Heart - normal rate, regular rhythm, normal S1, S2, no murmurs, rubs, clicks or gallops  Abdomen - soft, nontender, nondistended, no masses or organomegaly  Neurological -no focal deficit  Extremities - peripheral pulses normal, no pedal edema      Recent Labs:  Lab Results   Component Value Date/Time    Cholesterol, total 183 05/04/2021 12:33 PM    HDL Cholesterol 39 05/04/2021 12:33 PM    LDL, calculated 95.6 05/04/2021 12:33 PM    Triglyceride 242 (H) 05/04/2021 12:33 PM    CHOL/HDL Ratio 4.7 05/04/2021 12:33 PM     Lab Results   Component Value Date/Time    Creatinine 0.63 05/27/2020 10:08 AM     Lab Results   Component Value Date/Time    BUN 10 05/27/2020 10:08 AM     Lab Results   Component Value Date/Time    Potassium 4.4 05/27/2020 10:08 AM     Lab Results   Component Value Date/Time    Hemoglobin A1c 5.3 05/16/2018 12:50 PM     Lab Results   Component Value Date/Time    HGB 12.7 06/11/2020 03:05 PM     Lab Results   Component Value Date/Time    PLATELET 267 33/71/3673 03:05 PM       Reviewed:  Past Medical History:   Diagnosis Date    Depression     Dizziness      Social History     Tobacco Use   Smoking Status Never Smoker   Smokeless Tobacco Former User   Tobacco Comment    hookah      Social History     Substance and Sexual Activity   Alcohol Use No     No Known Allergies    Current Outpatient Medications   Medication Sig    escitalopram oxalate (LEXAPRO) 5 mg tablet Take 1 Tab by mouth daily. Indications: repeated episodes of anxiety    levothyroxine (SYNTHROID) 25 mcg tablet Take 1 Tab by mouth daily.  fexofenadine (ALLEGRA) 180 mg tablet Take 1 Tab by mouth daily as needed for Allergies.  gemfibroziL (LOPID) 600 mg tablet 600 mg. No current facility-administered medications for this visit.         Darling Mckeon MD  [facility] and [facility] [place]  [address] [facility] [address]  [address]

## 2021-06-09 ENCOUNTER — OFFICE VISIT (OUTPATIENT)
Dept: FAMILY MEDICINE CLINIC | Age: 41
End: 2021-06-09
Payer: MEDICAID

## 2021-06-09 VITALS
SYSTOLIC BLOOD PRESSURE: 120 MMHG | DIASTOLIC BLOOD PRESSURE: 80 MMHG | HEIGHT: 60 IN | HEART RATE: 91 BPM | OXYGEN SATURATION: 98 % | BODY MASS INDEX: 51.44 KG/M2 | WEIGHT: 262 LBS | RESPIRATION RATE: 18 BRPM | TEMPERATURE: 98.3 F

## 2021-06-09 DIAGNOSIS — M79.672 LEFT FOOT PAIN: ICD-10-CM

## 2021-06-09 DIAGNOSIS — E03.9 ACQUIRED HYPOTHYROIDISM: Primary | ICD-10-CM

## 2021-06-09 LAB — TSH SERPL DL<=0.05 MIU/L-ACNC: 3.38 UIU/ML (ref 0.36–3.74)

## 2021-06-09 PROCEDURE — 99214 OFFICE O/P EST MOD 30 MIN: CPT | Performed by: NURSE PRACTITIONER

## 2021-06-09 RX ORDER — AMOXICILLIN 500 MG/1
500 CAPSULE ORAL
COMMUNITY
End: 2021-11-22 | Stop reason: ALTCHOICE

## 2021-06-09 NOTE — PROGRESS NOTES
.  Chief Complaint   Patient presents with    Anxiety     medication follow up , has not taken medication    Foot Pain     left foot pain and swelling and right leg pain    Other     was seen by OB/GYN and recommends seeing an endocrinologist      1. Have you been to the ER, urgent care clinic since your last visit? Hospitalized since your last visit? No    2. Have you seen or consulted any other health care providers outside of the [address] [place] [address] since your last visit? Include any pap smears or colon screening.  No    Visit Vitals  /80 (BP 1 Location: Left arm, BP Patient Position: Sitting, BP Cuff Size: Large adult long)   Pulse 91   Temp 98.3 °F (36.8 °C) (Temporal)   Resp 18   Ht 5' (1.524 m)   Wt 262 lb (118.8 kg)   SpO2 98%   BMI 51.17 kg/m²

## 2021-06-09 NOTE — PROGRESS NOTES
Assessment/Plan:     Diagnoses and all orders for this visit:    1. Acquired hypothyroidism  -     TSH 3RD GENERATION; Future  Continue thyroid replacement. Labs pending. 2. Left foot pain  -     REFERRAL TO PODIATRY             Discussed expected course/resolution/complications of diagnosis in detail with patient. Medication risks/benefits/costs/interactions/alternatives discussed with patient. Pt was given after visit summary which includes diagnoses, current medications & vitals. Pt expressed understanding with the diagnosis and plan          Subjective:      Susana Timmons is a 35 y.o. female who presents for had concerns including Anxiety (medication follow up , has not taken medication), Foot Pain (left foot pain and swelling and right leg pain), and Other (was seen by OB/GYN and recommends seeing an endocrinologist ). She presents with her daughter today who assist in translation as the patient is Turkmen speaking only. They are interested in referral to podiatry for chronic left foot pain and associated bunion. She has been compliant with Synthroid and is due for repeat labs at this time. She is tolerating without difficulty. She has a stress test tomorrow for atypical chest pain. She did not start Lexapro as she feels her anxiety has improved. Patient Active Problem List   Diagnosis Code    Dizziness R42    HA (headache) R51.9    Depression F32.9    Obesity, morbid ([facility] [address] E66.01    DOUGLAS (generalized anxiety disorder) F41.1    Acquired hypothyroidism E03.9    H. pylori infection A04.8    Throat pain R07.0    Acute otitis externa of left ear H60.502    Shortness of breath R06.02    Left leg pain M79.605    Pelvic pain R10.2    Hyperlipidemia LDL goal <100 E78.5       Current Outpatient Medications   Medication Sig Dispense Refill    levothyroxine (SYNTHROID) 25 mcg tablet Take 1 Tab by mouth daily. 90 Tab 1    amoxicillin (AMOXIL) 500 mg capsule 500 mg.  (Patient not taking: Reported on 6/9/2021)      escitalopram oxalate (LEXAPRO) 5 mg tablet Take 1 Tab by mouth daily. Indications: repeated episodes of anxiety (Patient not taking: Reported on 6/9/2021) 30 Tab 1    fexofenadine (ALLEGRA) 180 mg tablet Take 1 Tab by mouth daily as needed for Allergies. (Patient not taking: Reported on 6/9/2021) 30 Tab 5    gemfibroziL (LOPID) 600 mg tablet 600 mg. (Patient not taking: Reported on 6/9/2021)         No Known Allergies    ROS:   Review of Systems   Constitutional: Negative for malaise/fatigue. Eyes: Negative for blurred vision. Respiratory: Negative for shortness of breath. Cardiovascular: Negative for chest pain. Musculoskeletal: Positive for joint pain. Objective:     Visit Vitals  /80 (BP 1 Location: Left arm, BP Patient Position: Sitting, BP Cuff Size: Large adult long)   Pulse 91   Temp 98.3 °F (36.8 °C) (Temporal)   Resp 18   Ht 5' (1.524 m)   Wt 262 lb (118.8 kg)   LMP 05/21/2021   SpO2 98%   BMI 51.17 kg/m²       Vitals and Nurse Documentation reviewed. Physical Exam  Constitutional:       General: She is not in acute distress. Appearance: She is obese. Cardiovascular:      Heart sounds: S1 normal and S2 normal. No murmur heard. No friction rub. No gallop. Pulmonary:      Effort: No respiratory distress. Breath sounds: Normal breath sounds. Skin:     General: Skin is warm and dry. Psychiatric:         Mood and Affect: Affect normal. Mood is anxious.

## 2021-06-10 ENCOUNTER — ANCILLARY PROCEDURE (OUTPATIENT)
Dept: CARDIOLOGY CLINIC | Age: 41
End: 2021-06-10
Payer: MEDICAID

## 2021-06-10 VITALS
HEIGHT: 60 IN | WEIGHT: 263 LBS | DIASTOLIC BLOOD PRESSURE: 86 MMHG | SYSTOLIC BLOOD PRESSURE: 140 MMHG | BODY MASS INDEX: 51.63 KG/M2

## 2021-06-10 DIAGNOSIS — I20.0 UNSTABLE ANGINA (HCC): ICD-10-CM

## 2021-06-10 DIAGNOSIS — Z82.49 FAMILY HISTORY OF EARLY CAD: ICD-10-CM

## 2021-06-10 DIAGNOSIS — E78.5 HYPERLIPIDEMIA LDL GOAL <100: ICD-10-CM

## 2021-06-10 DIAGNOSIS — R42 DIZZINESS: ICD-10-CM

## 2021-06-10 DIAGNOSIS — R06.02 SHORTNESS OF BREATH: ICD-10-CM

## 2021-06-10 DIAGNOSIS — E66.01 OBESITY, MORBID (HCC): ICD-10-CM

## 2021-06-10 PROCEDURE — 78452 HT MUSCLE IMAGE SPECT MULT: CPT | Performed by: INTERNAL MEDICINE

## 2021-06-10 PROCEDURE — A9500 TC99M SESTAMIBI: HCPCS | Performed by: INTERNAL MEDICINE

## 2021-06-10 PROCEDURE — 93015 CV STRESS TEST SUPVJ I&R: CPT | Performed by: INTERNAL MEDICINE

## 2021-06-10 RX ORDER — TETRAKIS(2-METHOXYISOBUTYLISOCYANIDE)COPPER(I) TETRAFLUOROBORATE 1 MG/ML
40 INJECTION, POWDER, LYOPHILIZED, FOR SOLUTION INTRAVENOUS ONCE
Status: COMPLETED | OUTPATIENT
Start: 2021-06-10 | End: 2021-06-10

## 2021-06-10 RX ADMIN — TETRAKIS(2-METHOXYISOBUTYLISOCYANIDE)COPPER(I) TETRAFLUOROBORATE 25.4 MILLICURIE: 1 INJECTION, POWDER, LYOPHILIZED, FOR SOLUTION INTRAVENOUS at 14:15

## 2021-06-14 ENCOUNTER — APPOINTMENT (OUTPATIENT)
Dept: CARDIOLOGY CLINIC | Age: 41
End: 2021-06-14

## 2021-06-14 PROCEDURE — A9500 TC99M SESTAMIBI: HCPCS | Performed by: INTERNAL MEDICINE

## 2021-06-14 RX ORDER — TETRAKIS(2-METHOXYISOBUTYLISOCYANIDE)COPPER(I) TETRAFLUOROBORATE 1 MG/ML
40 INJECTION, POWDER, LYOPHILIZED, FOR SOLUTION INTRAVENOUS ONCE
Status: COMPLETED | OUTPATIENT
Start: 2021-06-14 | End: 2021-06-14

## 2021-06-14 RX ADMIN — TETRAKIS(2-METHOXYISOBUTYLISOCYANIDE)COPPER(I) TETRAFLUOROBORATE 25.8 MILLICURIE: 1 INJECTION, POWDER, LYOPHILIZED, FOR SOLUTION INTRAVENOUS at 13:55

## 2021-06-15 LAB
STRESS BASELINE DIAS BP: 86 MMHG
STRESS BASELINE HR: 107 BPM
STRESS BASELINE SYS BP: 140 MMHG
STRESS O2 SAT PEAK: 100 %
STRESS O2 SAT REST: 99 %
STRESS PEAK DIAS BP: 76 MMHG
STRESS PEAK SYS BP: 142 MMHG
STRESS PERCENT HR ACHIEVED: 88 %
STRESS POST PEAK HR: 155 BPM
STRESS RATE PRESSURE PRODUCT: NORMAL BPM*MMHG
STRESS TARGET HR: 177 BPM

## 2021-07-19 PROBLEM — G47.33 OSA (OBSTRUCTIVE SLEEP APNEA): Status: ACTIVE | Noted: 2021-07-19

## 2021-08-06 LAB — SARS-COV-2, NAA: NEGATIVE

## 2021-10-05 LAB — SARS-COV-2, NAA: NOT DETECTED

## 2021-11-06 DIAGNOSIS — E03.9 ACQUIRED HYPOTHYROIDISM: ICD-10-CM

## 2021-11-08 RX ORDER — LEVOTHYROXINE SODIUM 25 UG/1
TABLET ORAL
Qty: 90 TABLET | Refills: 1 | Status: SHIPPED | OUTPATIENT
Start: 2021-11-08 | End: 2022-06-17 | Stop reason: SDUPTHER

## 2021-11-18 ENCOUNTER — OFFICE VISIT (OUTPATIENT)
Dept: FAMILY MEDICINE CLINIC | Age: 41
End: 2021-11-18
Payer: MEDICAID

## 2021-11-18 VITALS
BODY MASS INDEX: 51.55 KG/M2 | HEIGHT: 60 IN | OXYGEN SATURATION: 99 % | HEART RATE: 74 BPM | SYSTOLIC BLOOD PRESSURE: 127 MMHG | TEMPERATURE: 99 F | RESPIRATION RATE: 16 BRPM | WEIGHT: 262.6 LBS | DIASTOLIC BLOOD PRESSURE: 67 MMHG

## 2021-11-18 DIAGNOSIS — R51.9 NONINTRACTABLE EPISODIC HEADACHE, UNSPECIFIED HEADACHE TYPE: ICD-10-CM

## 2021-11-18 DIAGNOSIS — M79.672 LEFT FOOT PAIN: Primary | ICD-10-CM

## 2021-11-18 PROCEDURE — 99213 OFFICE O/P EST LOW 20 MIN: CPT | Performed by: NURSE PRACTITIONER

## 2021-11-18 RX ORDER — RIMEGEPANT SULFATE 75 MG/75MG
75 TABLET, ORALLY DISINTEGRATING ORAL
Qty: 10 TABLET | Refills: 0 | Status: SHIPPED | OUTPATIENT
Start: 2021-11-18 | End: 2021-11-18

## 2021-11-18 NOTE — PROGRESS NOTES
Chief Complaint   Patient presents with    Results       1. \"Have you been to the ER, urgent care clinic since your last visit? Hospitalized since your last visit? \" Yes When: 09/2021 Where: [address]  Reason for visit: Dizzy    2. \"Have you seen or consulted any other health care providers outside of the [address] [place] [address] since your last visit? \" No     3. For patients over 45: Has the patient had a colonoscopy? Yes, HM satisfied with blue hyperlink     If the patient is female:    4. For patients over 40: Has the patient had a mammogram? Yes, HM satisfied with blue hyperlink    5. For patients over 21: Has the patient had a pap smear?  Yes, HM satisfied with blue hyperlink    3 most recent PHQ Screens 11/18/2021   Little interest or pleasure in doing things Not at all   Feeling down, depressed, irritable, or hopeless Not at all   Total Score PHQ 2 0     Health Maintenance Due   Topic Date Due    Hepatitis C Screening  Never done    COVID-19 Vaccine (1) Never done    Flu Vaccine (1) 09/01/2021

## 2021-11-22 NOTE — PROGRESS NOTES
Assessment/Plan:     Diagnoses and all orders for this visit:    1. Left foot pain  -     REFERRAL TO ORTHOPEDICS for evaluation. 2. Nonintractable episodic headache, unspecified headache type  -     REFERRAL TO NEUROLOGY  -     rimegepant (Nurtec ODT) 75 mg disintegrating tablet; Take 1 Tablet by mouth once as needed for Migraine for up to 1 dose. She will follow up with neurology. Unclear if these are migraines or possibly cervicogenic in nature. Trial of Nurtec. Follow up if no improvement. Discussed expected course/resolution/complications of diagnosis in detail with patient. Medication risks/benefits/costs/interactions/alternatives discussed with patient. Pt was given after visit summary which includes diagnoses, current medications & vitals. Pt expressed understanding with the diagnosis and plan          Subjective:      Thom Savage is a 35 y.o. female who presents for had concerns including Results. She is accompanied by her daughter today who assists in translation. She continues with a longstanding history of headaches. She reports a daily headache. They are global.  They are associated with nausea and light sensitivity. She is taking otc treatment without improvement. She was previously seen by a podiatrist for left foot pain however she would like a second opinion. Patient Active Problem List   Diagnosis Code    Dizziness R42    HA (headache) R51.9    Depression F32. A    Obesity, morbid ([address] E66.01    DOUGLAS (generalized anxiety disorder) F41.1    Acquired hypothyroidism E03.9    H. pylori infection A04.8    Throat pain R07.0    Acute otitis externa of left ear H60.502    Shortness of breath R06.02    Left leg pain M79.605    Pelvic pain R10.2    Hyperlipidemia LDL goal <100 E78.5    ZACH (obstructive sleep apnea) G47.33       Current Outpatient Medications   Medication Sig Dispense Refill    levothyroxine (SYNTHROID) 25 mcg tablet TAKE 1 TABLET BY MOUTH EVERY DAY 90 Tablet 1       No Known Allergies    ROS:   Review of Systems   Constitutional: Negative for malaise/fatigue. Eyes: Negative for blurred vision. Respiratory: Negative for shortness of breath. Cardiovascular: Negative for chest pain. Musculoskeletal: Positive for joint pain. Neurological: Positive for headaches. Objective:     Visit Vitals  /67 (BP 1 Location: Left upper arm, BP Patient Position: Sitting, BP Cuff Size: Large adult long)   Pulse 74   Temp 99 °F (37.2 °C) (Temporal)   Resp 16   Ht 5' (1.524 m)   Wt 262 lb 9.6 oz (119.1 kg)   LMP 11/09/2021 (Approximate)   SpO2 99%   BMI 51.29 kg/m²       Vitals and Nurse Documentation reviewed. Physical Exam  Constitutional:       General: She is not in acute distress. Appearance: She is obese. Cardiovascular:      Heart sounds: S1 normal and S2 normal. No murmur heard. No friction rub. No gallop. Pulmonary:      Effort: No respiratory distress. Breath sounds: Normal breath sounds. Skin:     General: Skin is warm and dry. Psychiatric:         Mood and Affect: Affect normal. Mood is anxious.

## 2021-12-10 ENCOUNTER — OFFICE VISIT (OUTPATIENT)
Dept: ORTHOPEDIC SURGERY | Age: 41
End: 2021-12-10
Payer: MEDICAID

## 2021-12-10 VITALS — BODY MASS INDEX: 45.24 KG/M2 | WEIGHT: 265 LBS | HEIGHT: 64 IN

## 2021-12-10 DIAGNOSIS — M79.672 BILATERAL FOOT PAIN: ICD-10-CM

## 2021-12-10 DIAGNOSIS — M25.579 JOINT PAIN OF ANKLE AND FOOT, UNSPECIFIED LATERALITY: ICD-10-CM

## 2021-12-10 DIAGNOSIS — E66.01 MORBID OBESITY (HCC): ICD-10-CM

## 2021-12-10 DIAGNOSIS — M19.079 ARTHRITIS OF MIDFOOT: Primary | ICD-10-CM

## 2021-12-10 DIAGNOSIS — M79.671 BILATERAL FOOT PAIN: ICD-10-CM

## 2021-12-10 DIAGNOSIS — M72.2 PLANTAR FASCIITIS, LEFT: ICD-10-CM

## 2021-12-10 PROCEDURE — 99203 OFFICE O/P NEW LOW 30 MIN: CPT | Performed by: ORTHOPAEDIC SURGERY

## 2021-12-10 RX ORDER — METHYLPREDNISOLONE 4 MG/1
TABLET ORAL
Qty: 1 DOSE PACK | Refills: 0 | Status: SHIPPED | OUTPATIENT
Start: 2021-12-10 | End: 2021-12-22

## 2021-12-10 NOTE — PROGRESS NOTES
Brandyn Mott (: 1980) is a 35 y.o. female, patient,here for evaluation of the following   Chief Complaint   Patient presents with    Foot Pain     bilateral foot and ankle pain for 9 months. intermittent. worse with prolonged standing. has been in a boot for one side, but that didn't help. ASSESSMENT/PLAN:  Below is the assessment and plan developed based on review of pertinent history, physical exam, labs, studies, and medications. 1. Arthritis of midfoot  2. Bilateral foot pain  -     XR STANDING FOOT BI COMP 3 V; Future  -     REFERRAL TO PHYSICAL THERAPY  3. Joint pain of ankle and foot, unspecified laterality  -     XR STANDING ANKLE BI COMP 3 V; Future  -     REFERRAL TO PHYSICAL THERAPY  4. Plantar fasciitis, left  -     REFERRAL TO PHYSICAL THERAPY  5. Morbid obesity ([address]      I recommended physical therapy program, provide a Medrol pack to use for the inflammatory pain and swelling and recommend appropriate shoe wear and insoles. Recommend appropriate way to stretch every day to relieve the heel pain which is in addition to the midfoot pain. Weight loss would also be helpful. No current surgical indications. Return if symptoms worsen or fail to improve. No Known Allergies    Current Outpatient Medications   Medication Sig    methylPREDNISolone (MEDROL DOSEPACK) 4 mg tablet Take 1 Tablet by mouth Specific Days and Specific Times for 6 days, THEN 1 Tablet Specific Days and Specific Times for 6 days. As instructed on packet    levothyroxine (SYNTHROID) 25 mcg tablet TAKE 1 TABLET BY MOUTH EVERY DAY     No current facility-administered medications for this visit.        Past Medical History:   Diagnosis Date    Depression     Dizziness        Past Surgical History:   Procedure Laterality Date    HX HEMORRHOIDECTOMY         Family History   Problem Relation Age of Onset    No Known Problems Mother     No Known Problems Father        Social History     Socioeconomic History  Marital status:      Spouse name: Not on file    Number of children: Not on file    Years of education: Not on file    Highest education level: Not on file   Occupational History    Not on file   Tobacco Use    Smoking status: Never Smoker    Smokeless tobacco: Former User    Tobacco comment: hookah    Vaping Use    Vaping Use: Never used   Substance and Sexual Activity    Alcohol use: No    Drug use: No    Sexual activity: Not Currently     Partners: Male     Birth control/protection: None   Other Topics Concern    Not on file   Social History Narrative    Not on file     Social Determinants of Health     Financial Resource Strain:     Difficulty of Paying Living Expenses: Not on file   Food Insecurity:     Worried About [address] [employer] in the Last Year: Not on file    Juice of Food in the Last Year: Not on file   Transportation Needs:     Lack of Transportation (Medical): Not on file    Lack of Transportation (Non-Medical):  Not on file   Physical Activity:     Days of Exercise per Week: Not on file    Minutes of Exercise per Session: Not on file   Stress:     Feeling of Stress : Not on file   Social Connections:     Frequency of Communication with Friends and Family: Not on file    Frequency of Social Gatherings with Friends and Family: Not on file    Attends [employer] Services: Not on file    Active Member of [address] [place] [address] [employer] or Organizations: Not on file    Attends Club or Organization Meetings: Not on file    Marital Status: Not on file   Intimate Partner Violence:     Fear of Current or Ex-Partner: Not on file    Emotionally Abused: Not on file    Physically Abused: Not on file    Sexually Abused: Not on file   Housing Stability:     Unable to Pay for Housing in the Last Year: Not on file    Number of [place] in the Last Year: Not on file    Unstable Housing in the Last Year: Not on file           Vitals:  Ht 5' 4\" (1.626 m)   Wt 265 lb (120.2 kg)   BMI 45.49 kg/m²    Body mass index is 45.49 kg/m². SUBJECTIVE/OBJECTIVE:  Gutierrez Quiroz (: 1980)   New patient presents today with complaint of bilateral foot pain this has been a chronic problem she states but it seems to have gotten worse over several weeks to months and prolonged standing makes it worse. She has had moderate sharp pain that comes and goes associated with swelling, numbness and weakness sensation. Standing, squatting, stairs/steps, running walking make it worse. Symptoms unchanged despite rest and heat. She has tried a boot and has had a previous injection approximately 4 months ago. She is not diabetic, non-smoker. Physical Exam  Pleasant well-nourished female , alert and oriented to person, time and place, no acute distress. Mild antalgic gait, satisfactory weightbearing stance. Bilateral ankle: There is no tenderness to palpation, tightness to dorsiflexion with a positive Silfverskiold test bilateral, Achilles tendon intact with a negative Connors test, negative ankle squeeze test.  Ligaments are grossly stable. No swelling, no erythema, no fluctuance, no ecchymosis. Bilateral foot: Satisfactory alignment, diffuse tenderness to palpation around the midfoot joints and the plantar fascia is also tender right slightly more than left. Negative calcaneal squeeze test, negative Tinel's, minimal swelling, no ecchymosis, ligaments grossly stable. Neurovascular exam intact for light touch sensation, cap refill, dorsalis pedis pulse palpable, flexion/extension strength 5/5. Skin intact without open wounds, lesions or ulcers, no skin discolorations, normal warmth to skin. Imaging:    XR Results (maximum last 2): Results from Appointment encounter on 12/10/21    XR STANDING ANKLE BI COMP 3 V    Narrative  Bilateral standing AP, lateral and oblique ankle x-rays show no acute fractures or dislocations, there is normal ankle mortise, normal bone density.   No acute findings. XR STANDING FOOT BI COMP 3 V    Narrative  Bilateral foot AP, lateral oblique weightbearing x-rays show no acute fractures or dislocation, left foot has mild to moderate hallux valgus, joint space satisfactory, right foot no hallux valgus, there is supination on weightbearing x-rays, no acute findings. There is some midfoot arthritis present most notable on lateral view. Normal bone density, no lesions. An electronic signature was used to authenticate this note.   -- Azeem Mixon MD

## 2021-12-29 ENCOUNTER — HOSPITAL ENCOUNTER (OUTPATIENT)
Dept: PHYSICAL THERAPY | Age: 41
Discharge: HOME | End: 2021-12-29
Payer: MEDICAID

## 2021-12-29 PROCEDURE — 97161 PT EVAL LOW COMPLEX 20 MIN: CPT | Performed by: PHYSICAL THERAPIST

## 2021-12-29 NOTE — PROGRESS NOTES
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 Assessment/ key information: Patient presents with B foot pain with decreased ankle DF ROM and decreased strength limiting ability to perform functional activities such as standing and walking. She would benefit from skilled PT to increase DF ROM and strength and decrease pain so she can return to prior level of function.      Evaluation Complexity History MEDIUM  Complexity : 1-2 comorbidities / personal factors will impact the outcome/ POC ; Examination LOW Complexity : 1-2 Standardized tests and measures addressing body structure, function, activity limitation and / or participation in recreation  ;Presentation LOW Complexity : Stable, uncomplicated  ;Clinical Decision Making LOW Complexity : FOTO score of   Overall Complexity Rating: LOW     Problem List: pain affecting function, decrease ROM, decrease strength, edema affecting function, impaired gait/ balance, decrease ADL/ functional abilitiies, decrease activity tolerance and decrease flexibility/ joint mobility   Treatment Plan may include any combination of the following: Therapeutic exercise, Therapeutic activities, Neuromuscular re-education, Physical agent/modality, Gait/balance training, Manual therapy and Patient education  Patient / Family readiness to learn indicated by: asking questions, trying to perform skills and interest  Persons(s) to be included in education: patient (P), daughter  Barriers to Learning/Limitations: yes;  language  Patient Goal (s): Less foot pain  Patient Self Reported Health Status: fair  Rehabilitation Potential: good    Short Term Goals: To be accomplished in 2 weeks:    1. Patient will be I in HEP to promote self management of symptoms. 2. Patient will report pain level at worst as less than or equal to 6/10 so they can perform ADLs without pain. Long Term Goals: To be accomplished in 4-6 weeks:    1. Patient will report pain level at worst as less than or equal to 4/10 so they can perform ADLs without pain. 2. Patient will be able to stand > or = 1 hour without limitation from foot pain so she can complete household tasks. 3. Patient will be able to ambulate > or = 1/2 mile without limitation from foot pain. Frequency / Duration: Patient to be seen 2 times per week for 4 weeks. Patient/ Caregiver education and instruction: exercises    [x]  Plan of care has been reviewed with CAMILLA Mendoza, PT 12/29/2021 12:28 PM    ________________________________________________________________________    I certify that the above Therapy Services are being furnished while the patient is under my care. I agree with the treatment plan and certify that this therapy is necessary.     [de-identified] Signature:____________________  Date:____________Time: _________

## 2021-12-29 NOTE — PROGRESS NOTES
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 She is accompanied by her daughter who helps interpret. The foot pain has been going on for 3 years but has been worse over the past 9 months. The L foot is worse than the R. States that they have seen an orthopedist who recommended new shoes, referred her to PT and prescribed steroids. States that the steroids helped a little bit but the pain returned after a few days. Pain Location: L great toe, MTP joint, toes, heel, bottom of foot  Pain Description: sharp, tense    Aggravating Factors: standing > 30-40 minutes   Relieving factors: sitting, resting  Current Functional Limitations: Pain with prolonged standing and walking   PLOF: Longstanding history of foot pain for 3 years   Previous Treatment/Compliance: medication  PMHx/Surgical Hx: Anxiety, OA, thyroid, HTN   Work Hx: not working   Living Situation: Lives with 3 children   Pt Goals: \"Less foot pain. \"   Barriers: language  Motivation: good           OBJECTIVE/EXAMINATION  Observation: mild L great toe adduction     Gait: antalgic     Edema: mild dorsum of L foot     ROM:     Ankle AROM:   R Ankle DF: lacking 2 degrees     L Ankle DF: lacking 2 degrees      Ankle PROM:   R Ankle DF: 0 degrees    R Ankle PF: WNL  R Ankle inversion: WNL  R Ankle eversion: WNL    L Ankle DF: 0 degrees  L Ankle PF:  WNL  L Ankle inversion: WNL  L Ankle eversion: WNL        Strength:    B heel raise painful L foot     Balance: L SLS 5 sec painful, R SLS 10 sec       Palpation: TTP L first MTP joint     Joint mobility: hypomobile metatarsal glides L       5 min Therapeutic Exercise:  [x] See flow sheet : towel curl, seated calf stretch, seated heel raise    Rationale: increase ROM and increase strength to improve the patients ability to stand, walk       5 min Manual Therapy: L metatarsal glides, B ankle DF stretching     Rationale: decrease pain, increase ROM and increase tissue extensibility to improve the patients ability to stand, walk             With   [x] TE   [] TA   [] neuro   [] other: Patient Education: [x] Review HEP    [] Progressed/Changed HEP based on:   [] positioning   [] body mechanics   [] transfers   [] heat/ice application    [x] other: role of PT, expected course of PT     Other Objective/Functional Measures:NT    Pain Level (0-10 scale) post treatment: 3    ASSESSMENT/Changes in Function:     [x]  See Plan of [address], PT 12/29/2021  11:24 AM

## 2022-01-04 ENCOUNTER — APPOINTMENT (OUTPATIENT)
Dept: PHYSICAL THERAPY | Age: 42
End: 2022-01-04

## 2022-02-18 ENCOUNTER — TELEPHONE (OUTPATIENT)
Dept: FAMILY MEDICINE CLINIC | Age: 42
End: 2022-02-18

## 2022-03-09 ENCOUNTER — OFFICE VISIT (OUTPATIENT)
Dept: FAMILY MEDICINE CLINIC | Age: 42
End: 2022-03-09
Payer: MEDICAID

## 2022-03-09 VITALS
HEIGHT: 64 IN | OXYGEN SATURATION: 97 % | WEIGHT: 273.6 LBS | BODY MASS INDEX: 46.71 KG/M2 | DIASTOLIC BLOOD PRESSURE: 84 MMHG | TEMPERATURE: 98.6 F | SYSTOLIC BLOOD PRESSURE: 130 MMHG | HEART RATE: 80 BPM | RESPIRATION RATE: 16 BRPM

## 2022-03-09 DIAGNOSIS — K59.00 CONSTIPATION, UNSPECIFIED CONSTIPATION TYPE: Primary | ICD-10-CM

## 2022-03-09 DIAGNOSIS — E03.9 ACQUIRED HYPOTHYROIDISM: ICD-10-CM

## 2022-03-09 DIAGNOSIS — R51.9 NONINTRACTABLE EPISODIC HEADACHE, UNSPECIFIED HEADACHE TYPE: ICD-10-CM

## 2022-03-09 PROCEDURE — 99214 OFFICE O/P EST MOD 30 MIN: CPT | Performed by: NURSE PRACTITIONER

## 2022-03-09 RX ORDER — POLYETHYLENE GLYCOL 3350 17 G/17G
17 POWDER, FOR SOLUTION ORAL DAILY
Qty: 30 PACKET | Refills: 5 | Status: SHIPPED | OUTPATIENT
Start: 2022-03-09 | End: 2022-06-17 | Stop reason: SDUPTHER

## 2022-03-09 NOTE — PROGRESS NOTES
Chief Complaint   Patient presents with    Headache    Dizziness     few years       1. \"Have you been to the ER, urgent care clinic since your last visit? Hospitalized since your last visit? \" No    2. \"Have you seen or consulted any other health care providers outside of the [address] [place] [address] since your last visit? \" No     3. For patients over 45: Has the patient had a colonoscopy? Yes - no Care Gap present     If the patient is female:    4. For patients over 40: Has the patient had a mammogram? Yes - no Care Gap present    5. For patients over 21: Has the patient had a pap smear?  Yes - no Care Gap present    3 most recent PHQ Screens 3/9/2022   Little interest or pleasure in doing things Not at all   Feeling down, depressed, irritable, or hopeless Several days   Total Score PHQ 2 1     Health Maintenance Due   Topic Date Due    Hepatitis C Screening  Never done    COVID-19 Vaccine (1) Never done

## 2022-03-09 NOTE — PROGRESS NOTES
Assessment/Plan:     Diagnoses and all orders for this visit:    1. Constipation, unspecified constipation type  -     polyethylene glycol (MIRALAX) 17 gram packet; Take 1 Packet by mouth daily. Stable. Refilled medications. Continue current therapy. 2. Acquired hypothyroidism  Medication noncompliance. We have discussed the risks of unknown controlled hypothyroidism and she will restart treatment as directed. 3. Nonintractable episodic headache, unspecified headache type  I have encouraged her to reduce ibuprofen use and utilize the Nurtec previously prescribed. Follow-up and Dispositions    · Return in about 3 months (around 6/9/2022) for Follow Up. Discussed expected course/resolution/complications of diagnosis in detail with patient. Medication risks/benefits/costs/interactions/alternatives discussed with patient. Pt was given after visit summary which includes diagnoses, current medications & vitals. Pt expressed understanding with the diagnosis and plan          Subjective:      Rad Hudson is a 35 y.o. female who presents for had concerns including Headache and Dizziness (few years). Reports several concerns today which are translated by her daughter as the patient is French speaking only. Patient has been off levothyroxine for 1 week. She thought this medication was responsible for weight gain. She discontinued of her own volition. She continues with occasional headaches. She is taking ibuprofen 1 tablet every other day. She did not fill Nurtec which was previously prescribed. She missed the neurology appointment and is rescheduled for April. Continues with occasional vertigo 1-2 times monthly. She is currently on MiraLAX for chronic constipation and would like this prescribed as it is expensive over-the-counter. Symptoms are stable at this time.     Patient Active Problem List   Diagnosis Code    Dizziness R42    HA (headache) R51.9    Depression F32.A    Obesity, morbid (HCC) E66.01    DOUGLAS (generalized anxiety disorder) F41.1    Acquired hypothyroidism E03.9    H. pylori infection A04.8    Throat pain R07.0    Acute otitis externa of left ear H60.502    Shortness of breath R06.02    Left leg pain M79.605    Pelvic pain R10.2    Hyperlipidemia LDL goal <100 E78.5    ZACH (obstructive sleep apnea) G47.33       Current Outpatient Medications   Medication Sig Dispense Refill    polyethylene glycol (MIRALAX) 17 gram packet Take 1 Packet by mouth daily. 30 Packet 5    levothyroxine (SYNTHROID) 25 mcg tablet TAKE 1 TABLET BY MOUTH EVERY DAY 90 Tablet 1       No Known Allergies    ROS:   Review of Systems   Constitutional: Negative for malaise/fatigue. Eyes: Negative for blurred vision. Respiratory: Negative for shortness of breath. Cardiovascular: Negative for chest pain. Gastrointestinal: Positive for constipation. Neurological: Positive for dizziness and headaches. Objective:     Visit Vitals  /84 (BP Patient Position: Sitting, BP Cuff Size: Large adult long)   Pulse 80   Temp 98.6 °F (37 °C) (Temporal)   Resp 16   Ht 5' 4\" (1.626 m)   Wt 273 lb 9.6 oz (124.1 kg)   SpO2 97%   BMI 46.96 kg/m²       Vitals and Nurse Documentation reviewed. Physical Exam  Constitutional:       General: She is not in acute distress. Appearance: She is obese. Cardiovascular:      Heart sounds: S1 normal and S2 normal. No murmur heard. No friction rub. No gallop. Pulmonary:      Effort: No respiratory distress. Breath sounds: Normal breath sounds. Skin:     General: Skin is warm and dry.    Psychiatric:         Mood and Affect: Mood and affect normal.

## 2022-03-10 NOTE — TELEPHONE ENCOUNTER
Daughter called today for levothyroxine. Noted it has a refill available from 11/8/21 90 + 1. Contacted [facility] and they are filling.   Thanks, Trace Shukla

## 2022-03-18 PROBLEM — A04.8 H. PYLORI INFECTION: Status: ACTIVE | Noted: 2020-06-30

## 2022-03-18 PROBLEM — E78.5 HYPERLIPIDEMIA LDL GOAL <100: Status: ACTIVE | Noted: 2020-06-30

## 2022-03-18 PROBLEM — H60.502 ACUTE OTITIS EXTERNA OF LEFT EAR: Status: ACTIVE | Noted: 2020-06-30

## 2022-03-19 PROBLEM — E66.01 OBESITY, MORBID (HCC): Status: ACTIVE | Noted: 2018-02-21

## 2022-03-19 PROBLEM — R10.2 PELVIC PAIN: Status: ACTIVE | Noted: 2020-06-30

## 2022-03-19 PROBLEM — M79.605 LEFT LEG PAIN: Status: ACTIVE | Noted: 2020-06-30

## 2022-03-19 PROBLEM — R07.0 THROAT PAIN: Status: ACTIVE | Noted: 2020-06-30

## 2022-03-19 PROBLEM — R06.02 SHORTNESS OF BREATH: Status: ACTIVE | Noted: 2020-06-30

## 2022-03-19 PROBLEM — G47.33 OSA (OBSTRUCTIVE SLEEP APNEA): Status: ACTIVE | Noted: 2021-07-19

## 2022-03-19 PROBLEM — F41.1 GAD (GENERALIZED ANXIETY DISORDER): Status: ACTIVE | Noted: 2018-11-16

## 2022-03-20 PROBLEM — E03.9 ACQUIRED HYPOTHYROIDISM: Status: ACTIVE | Noted: 2020-06-30

## 2022-04-29 ENCOUNTER — OFFICE VISIT (OUTPATIENT)
Dept: NEUROLOGY | Age: 42
End: 2022-04-29
Payer: MEDICAID

## 2022-04-29 VITALS
BODY MASS INDEX: 46.17 KG/M2 | OXYGEN SATURATION: 96 % | HEART RATE: 83 BPM | SYSTOLIC BLOOD PRESSURE: 126 MMHG | RESPIRATION RATE: 16 BRPM | WEIGHT: 269 LBS | DIASTOLIC BLOOD PRESSURE: 98 MMHG | TEMPERATURE: 98.1 F

## 2022-04-29 DIAGNOSIS — R42 VERTIGO: Primary | ICD-10-CM

## 2022-04-29 PROCEDURE — 99204 OFFICE O/P NEW MOD 45 MIN: CPT | Performed by: PSYCHIATRY & NEUROLOGY

## 2022-04-29 NOTE — LETTER
4/29/2022    Patient: Bel Khanna   YOB: 1980   Date of Visit: 4/29/2022     John Armando NP  [address] [address]  Via In Basket    Dear John Armando NP,      Thank you for referring Ms. Bel Khanna to [address] [place] [address] for evaluation. My notes for this consultation are attached. If you have questions, please do not hesitate to call me. I look forward to following your patient along with you.       Sincerely,    Emelina Baker MD

## 2022-04-29 NOTE — PROGRESS NOTES
Neurology Consult Note      HISTORY PROVIDED BY: patient and daughter    Chief Complaint:   Chief Complaint   Patient presents with    New Patient     Unexplainable dizziness for the past several years to the point of almost passing out       Subjective:    Emil Covington is a 38 y.o. left handed female who presents in consultation for dizziness x 8-9 years. Pt is able to understand and speak some English, asks daughter to assist when needed. Every couple of months, \"everything stops working\" in her head. Dizziness clarified as spinning, has to sit down, causes anxiety. Occurring daily, at times more intense. No N/V. No diplopia. BP is normal range typically, but sometimes has SBP in 90's, unclear if associated with this spell or not. Will often feel it about a week prior to her menstrual cycle. No headaches. Pt states she has a great deal of stress due to her 6 children and living in [facility] is very hard. She also states that she does not like to take medications. Has been convinced after some time, to take her synthroid. Past Medical History:   Diagnosis Date    Anxiety     Depression     Hypothyroidism       Past Surgical History:   Procedure Laterality Date    HX HEMORRHOIDECTOMY        Social History     Socioeconomic History    Marital status:      Spouse name: Not on file    Number of children: Not on file    Years of education: Not on file    Highest education level: Not on file   Occupational History    Not on file   Tobacco Use    Smoking status: Never Smoker    Smokeless tobacco: Former User    Tobacco comment: hookah    Vaping Use    Vaping Use: Never used   Substance and Sexual Activity    Alcohol use: No    Drug use: No    Sexual activity: Not Currently     Partners: Male     Birth control/protection: None   Other Topics Concern    Not on file   Social History Narrative    Lives in [facility] with daughter who is 8yo, 2 sons 16y and 22y.  She is from [place], lived in [place] a long while. Social Determinants of Health     Financial Resource Strain:     Difficulty of Paying Living Expenses: Not on file   Food Insecurity:     Worried About Running Out of Food in the Last Year: Not on file    Juice of Food in the Last Year: Not on file   Transportation Needs:     Lack of Transportation (Medical): Not on file    Lack of Transportation (Non-Medical): Not on file   Physical Activity:     Days of Exercise per Week: Not on file    Minutes of Exercise per Session: Not on file   Stress:     Feeling of Stress : Not on file   Social Connections:     Frequency of Communication with Friends and Family: Not on file    Frequency of Social Gatherings with Friends and Family: Not on file    Attends [employer] Services: Not on file    Active Member of [address] [place] [address] or Organizations: Not on file    Attends Club or Organization Meetings: Not on file    Marital Status: Not on file   Intimate Partner Violence:     Fear of Current or Ex-Partner: Not on file    Emotionally Abused: Not on file    Physically Abused: Not on file    Sexually Abused: Not on file   Housing Stability:     Unable to Pay for Housing in the Last Year: Not on file    Number of [place] in the Last Year: Not on file    Unstable Housing in the Last Year: Not on file     Family History   Problem Relation Age of Onset    No Known Problems Mother     No Known Problems Father          Objective:   Review of Systems   Constitutional: Positive for malaise/fatigue. Respiratory: Positive for shortness of breath. Cardiovascular: Positive for leg swelling. Gastrointestinal: Positive for constipation. Psychiatric/Behavioral: Positive for memory loss. The patient is nervous/anxious. All other systems reviewed and are negative. No Known Allergies     Meds:  Outpatient Medications Prior to Visit   Medication Sig Dispense Refill    polyethylene glycol (MIRALAX) 17 gram packet Take 1 Packet by mouth daily.  27 Packet 5    levothyroxine (SYNTHROID) 25 mcg tablet TAKE 1 TABLET BY MOUTH EVERY DAY 90 Tablet 1     No facility-administered medications prior to visit. Imaging:  MRI Results (most recent):  No results found for this or any previous visit. CT Results (most recent):  No results found for this or any previous visit. Reviewed records in [employer] tab today    Lab Review   Results for orders placed or performed in visit on 04/05/22   NOVEL CORONAVIRUS (COVID-19)   Result Value Ref Range    SARS-CoV-2, HAYES Negative         Exam:  Visit Vitals  BP (!) 126/98 (BP 1 Location: Left arm, BP Patient Position: Sitting, BP Cuff Size: Thigh)   Pulse 83   Temp 98.1 °F (36.7 °C) (Temporal)   Resp 16   Wt 269 lb (122 kg)   SpO2 96%   BMI 46.17 kg/m²     General:  Alert, cooperative, no distress. Head:  Normocephalic, without obvious abnormality, atraumatic. Respiratory:  Heart:   Non labored breathing  Regular rate and rhythm, no murmurs   Neck:   2+ carotids, no bruits   Extremities: Warm, no cyanosis or edema. Pulses: 2+ radial pulses. Neurologic:  MS: Alert and oriented x 4, speech intact. Language intact. Attention and fund of knowledge appropriate. Recent and remote memory intact.   Cranial Nerves:  II: visual fields Full to confrontation   II: pupils Equal, round, reactive to light   II: optic disc    III,VII: ptosis none   III,IV,VI: extraocular muscles  EOMI, no nystagmus or diplopia   V: facial light touch sensation     VII: facial muscle function   symmetric   VIII: hearing intact   IX: soft palate elevation  normal   XI: trapezius strength     XI: sternocleidomastoid strength    XII: tongue  Midline     Motor: normal bulk and tone, no tremor              Strength: 5/5 throughout, no PD  Sensory: intact to LT  Coordination: FTN intact  Gait: antalgic gait, favors left leg due to pain  Reflexes: 2+ symmetric           Assessment/Plan   Pt is a 38 y.o. left handed female with 8-9 year h/o spinning dizziness causing her to sit down, no other associated sxs, no positional component, occurring daily, at times very intense and anxiety provoking. Exam is non-focal. Suspect peripheral vertigo, but given limitations of patient history and her anxiety, recommend MRI brain wo contrast to exclude structural etiology. Will have pt f/u in clinic in 1 month to review results, if negative, will offer vestibular therapy. ICD-10-CM ICD-9-CM    1. Vertigo  R42 780.4 MRI BRAIN WO CONT       Signed:   Adrienne Doshi MD  4/29/2022

## 2022-06-17 DIAGNOSIS — E03.9 ACQUIRED HYPOTHYROIDISM: ICD-10-CM

## 2022-06-17 DIAGNOSIS — K59.00 CONSTIPATION, UNSPECIFIED CONSTIPATION TYPE: ICD-10-CM

## 2022-06-17 RX ORDER — LEVOTHYROXINE SODIUM 25 UG/1
25 TABLET ORAL DAILY
Qty: 30 TABLET | Refills: 0 | Status: SHIPPED | OUTPATIENT
Start: 2022-06-17 | End: 2022-07-10 | Stop reason: SDUPTHER

## 2022-06-17 RX ORDER — POLYETHYLENE GLYCOL 3350 17 G/17G
17 POWDER, FOR SOLUTION ORAL DAILY
Qty: 30 PACKET | Refills: 0 | Status: SHIPPED | OUTPATIENT
Start: 2022-06-17

## 2022-06-17 NOTE — TELEPHONE ENCOUNTER
Patient call per call center for refills. Patient is out. Thanks, Maryuri    Last Visit: 3/9/22 NP Joan, labs 6/2021  Next Appointment: 7/8/22 RIVAS Franco  Previous Refill Encounter(s):   Levothyroxine 11/8/21 90 + 1  miralax 3/9/22 30 + 5    Requested Prescriptions     Pending Prescriptions Disp Refills    levothyroxine (SYNTHROID) 25 mcg tablet 30 Tablet 0     Sig: Take 1 Tablet by mouth daily. For [place] in place:    Recommendation Provided To:    Intervention Detail: New Rx: 2, reason: Patient Preference   Gap Closed?:    Intervention Accepted By:   Ramila Yee Time Spent (min): 2      Name of Medication? levothyroxine (SYNTHROID) 25 mcg tablet   Patient-reported dosage and instructions? 25 MG, QD   How many days do you have left? 0   Preferred Pharmacy? [facility]Y #[id number]   Pharmacy phone number (if available)? [phone]   Additional Information for Provider? Been out of meds for 2 days   ---------------------------------------------------------------------------   --------------,   Name of Medication? polyethylene glycol (MIRALAX) 17 gram packet   Patient-reported dosage and instructions? 17 gram packet   How many days do you have left? 0   Preferred Pharmacy? [facility]/PHARMACY #[id number]   Pharmacy phone number (if available)? [phone]   ---------------------------------------------------------------------------   --------------   [place] INFO   What is the best way for the office to contact you? Do not leave any   message, patient will call back for answer   Preferred Call Back Phone Number?  [phone]

## 2022-06-30 ENCOUNTER — HOSPITAL ENCOUNTER (OUTPATIENT)
Dept: MRI IMAGING | Age: 42
Discharge: HOME | End: 2022-06-30
Attending: PSYCHIATRY & NEUROLOGY
Payer: MEDICAID

## 2022-06-30 DIAGNOSIS — R42 VERTIGO: ICD-10-CM

## 2022-06-30 PROCEDURE — 70551 MRI BRAIN STEM W/O DYE: CPT

## 2022-07-06 ENCOUNTER — TELEPHONE (OUTPATIENT)
Dept: NEUROLOGY | Age: 42
End: 2022-07-06

## 2022-07-06 NOTE — TELEPHONE ENCOUNTER
----- Message from Elsi Appiah MD sent at 7/6/2022  1:54 PM EDT -----  Enoc Cobb - Please call pt: MRI brain wo contrast 6/30/22 is normal. No structural cause for her dizziness. If she is still having dizziness, I suggest she ask her PCP for a referral to Vestibular therapy.

## 2022-07-06 NOTE — PROGRESS NOTES
Franca - Please call pt: MRI brain wo contrast 6/30/22 is normal. No structural cause for her dizziness. If she is still having dizziness, I suggest she ask her PCP for a referral to Vestibular therapy.

## 2022-07-08 ENCOUNTER — OFFICE VISIT (OUTPATIENT)
Dept: FAMILY MEDICINE CLINIC | Age: 42
End: 2022-07-08
Payer: MEDICAID

## 2022-07-08 VITALS
SYSTOLIC BLOOD PRESSURE: 130 MMHG | HEIGHT: 64 IN | HEART RATE: 83 BPM | TEMPERATURE: 98.2 F | OXYGEN SATURATION: 97 % | RESPIRATION RATE: 16 BRPM | DIASTOLIC BLOOD PRESSURE: 84 MMHG | BODY MASS INDEX: 46.88 KG/M2 | WEIGHT: 274.6 LBS

## 2022-07-08 DIAGNOSIS — R42 DIZZINESS: Primary | ICD-10-CM

## 2022-07-08 DIAGNOSIS — Z13.220 SCREENING, LIPID: ICD-10-CM

## 2022-07-08 DIAGNOSIS — E03.9 ACQUIRED HYPOTHYROIDISM: ICD-10-CM

## 2022-07-08 DIAGNOSIS — E66.01 MORBID OBESITY (HCC): ICD-10-CM

## 2022-07-08 DIAGNOSIS — Z13.1 SCREENING FOR DIABETES MELLITUS: ICD-10-CM

## 2022-07-08 DIAGNOSIS — Z11.59 ENCOUNTER FOR HEPATITIS C SCREENING TEST FOR LOW RISK PATIENT: ICD-10-CM

## 2022-07-08 DIAGNOSIS — M79.671 PAIN IN RIGHT FOOT: ICD-10-CM

## 2022-07-08 PROCEDURE — 99214 OFFICE O/P EST MOD 30 MIN: CPT | Performed by: NURSE PRACTITIONER

## 2022-07-08 NOTE — PROGRESS NOTES
Assessment/Plan:     Diagnoses and all orders for this visit:    1. Dizziness  -     CBC WITH AUTOMATED DIFF; Future  -     METABOLIC PANEL, COMPREHENSIVE; Future  -     REFERRAL TO ENT-OTOLARYNGOLOGY  Refer for vestibular therapy. Labs pending. 2. Acquired hypothyroidism  -     TSH 3RD GENERATION; Future  -     T4, FREE; Future    3. Screening, lipid  -     LIPID PANEL; Future    4. Encounter for hepatitis C screening test for low risk patient  -     HEPATITIS C AB; Future    5. Screening for diabetes mellitus  -     HEMOGLOBIN A1C WITH EAG; Future    6. Pain in right foot  -     REFERRAL TO PHYSICAL THERAPY to restart treatment. 7. Morbid obesity ([address]  -     REFERRAL TO WEIGHT LOSS for additional evaluation. Follow-up and Dispositions    · Return in about 3 months (around 10/8/2022) for Follow Up. Discussed expected course/resolution/complications of diagnosis in detail with patient. Medication risks/benefits/costs/interactions/alternatives discussed with patient. Pt was given after visit summary which includes diagnoses, current medications & vitals. Pt expressed understanding with the diagnosis and plan          Subjective:      Alexandra Shukla is a 38 y.o. female who presents for had concerns including Follow-up. She is accompanied by her daughter today who assist in translation as the patient is Malay speaking only. She was previously evaluated for foot pain with Oli Leavitt and referred to physical therapy. She continues to describe excruciating pain which limits her activity. She only completed 1 day of physical therapy. She has been referred to neurology back to me for discussion of vestibular physical therapy for ongoing vertigo. Previous labs and MRI brain were unremarkable. She is interested in weight loss and working with a weight loss specialist at this time. She has attempted self-directed dieting without improvement.   She is limited in mobility with the previously discussed concerns. Patient Active Problem List   Diagnosis Code    Dizziness R42    HA (headache) R51.9    Depression F32. A    Obesity, morbid ([facility] [address] E66.01    DOUGLAS (generalized anxiety disorder) F41.1    Acquired hypothyroidism E03.9    H. pylori infection A04.8    Throat pain R07.0    Acute otitis externa of left ear H60.502    Shortness of breath R06.02    Left leg pain M79.605    Pelvic pain R10.2    Hyperlipidemia LDL goal <100 E78.5    ZACH (obstructive sleep apnea) G47.33       Current Outpatient Medications   Medication Sig Dispense Refill    levothyroxine (SYNTHROID) 25 mcg tablet Take 1 Tablet by mouth daily. 30 Tablet 0    polyethylene glycol (MIRALAX) 17 gram packet Take 1 Packet by mouth daily. 30 Packet 0       No Known Allergies    ROS:   Review of Systems   Constitutional: Negative for malaise/fatigue. Eyes: Negative for blurred vision. Respiratory: Negative for shortness of breath. Cardiovascular: Negative for chest pain. Musculoskeletal: Positive for joint pain. Neurological: Positive for dizziness. Objective:     Visit Vitals  /84 (BP 1 Location: Right upper arm, BP Patient Position: Sitting, BP Cuff Size: Large adult long)   Pulse 83   Temp 98.2 °F (36.8 °C) (Temporal)   Resp 16   Ht 5' 4\" (1.626 m)   Wt 274 lb 9.6 oz (124.6 kg)   LMP 07/05/2022 (Exact Date)   SpO2 97%   BMI 47.13 kg/m²       Vitals and Nurse Documentation reviewed. Physical Exam  Constitutional:       General: She is not in acute distress. Appearance: She is obese. Cardiovascular:      Heart sounds: S1 normal and S2 normal. No murmur heard. No friction rub. No gallop. Pulmonary:      Effort: No respiratory distress. Breath sounds: Normal breath sounds. Skin:     General: Skin is warm and dry. Psychiatric:         Mood and Affect: Affect normal. Mood is anxious.

## 2022-07-08 NOTE — PROGRESS NOTES
Chief Complaint   Patient presents with    Follow-up       1. \"Have you been to the ER, urgent care clinic since your last visit? Hospitalized since your last visit? \" No    2. \"Have you seen or consulted any other health care providers outside of the [address] [place] [address] since your last visit? \" No     3. For patients over 45: Has the patient had a colonoscopy? Yes - no Care Gap present     If the patient is female:    4. For patients over 40: Has the patient had a mammogram? Yes - no Care Gap present    5. For patients over 21: Has the patient had a pap smear?  Yes - no Care Gap present    3 most recent PHQ Screens 7/8/2022   Little interest or pleasure in doing things Not at all   Feeling down, depressed, irritable, or hopeless Not at all   Total Score PHQ 2 0     Health Maintenance Due   Topic Date Due    Hepatitis C Screening  Never done    COVID-19 Vaccine (1) Never done

## 2022-07-09 LAB
ALBUMIN SERPL-MCNC: 3.4 G/DL (ref 3.5–5)
ALBUMIN/GLOB SERPL: 1 {RATIO} (ref 1.1–2.2)
ALP SERPL-CCNC: 100 U/L (ref 45–117)
ALT SERPL-CCNC: 34 U/L (ref 12–78)
ANION GAP SERPL CALC-SCNC: 5 MMOL/L (ref 5–15)
AST SERPL-CCNC: 12 U/L (ref 15–37)
BASOPHILS # BLD: 0 K/UL (ref 0–0.1)
BASOPHILS NFR BLD: 0 % (ref 0–1)
BILIRUB SERPL-MCNC: 0.4 MG/DL (ref 0.2–1)
BUN SERPL-MCNC: 7 MG/DL (ref 6–20)
BUN/CREAT SERPL: 12 (ref 12–20)
CALCIUM SERPL-MCNC: 8.8 MG/DL (ref 8.5–10.1)
CHLORIDE SERPL-SCNC: 107 MMOL/L (ref 97–108)
CHOLEST SERPL-MCNC: 172 MG/DL
CO2 SERPL-SCNC: 28 MMOL/L (ref 21–32)
CREAT SERPL-MCNC: 0.57 MG/DL (ref 0.55–1.02)
DIFFERENTIAL METHOD BLD: NORMAL
EOSINOPHIL # BLD: 0.1 K/UL (ref 0–0.4)
EOSINOPHIL NFR BLD: 2 % (ref 0–7)
ERYTHROCYTE [DISTWIDTH] IN BLOOD BY AUTOMATED COUNT: 13.3 % (ref 11.5–14.5)
EST. AVERAGE GLUCOSE BLD GHB EST-MCNC: 117 MG/DL
GLOBULIN SER CALC-MCNC: 3.5 G/DL (ref 2–4)
GLUCOSE SERPL-MCNC: 97 MG/DL (ref 65–100)
HBA1C MFR BLD: 5.7 % (ref 4–5.6)
HCT VFR BLD AUTO: 39.7 % (ref 35–47)
HCV AB SERPL QL IA: NONREACTIVE
HDLC SERPL-MCNC: 35 MG/DL
HDLC SERPL: 4.9 {RATIO} (ref 0–5)
HGB BLD-MCNC: 12.1 G/DL (ref 11.5–16)
IMM GRANULOCYTES # BLD AUTO: 0 K/UL (ref 0–0.04)
IMM GRANULOCYTES NFR BLD AUTO: 0 % (ref 0–0.5)
LDLC SERPL CALC-MCNC: 73.4 MG/DL (ref 0–100)
LYMPHOCYTES # BLD: 2.5 K/UL (ref 0.8–3.5)
LYMPHOCYTES NFR BLD: 37 % (ref 12–49)
MCH RBC QN AUTO: 27.4 PG (ref 26–34)
MCHC RBC AUTO-ENTMCNC: 30.5 G/DL (ref 30–36.5)
MCV RBC AUTO: 89.8 FL (ref 80–99)
MONOCYTES # BLD: 0.4 K/UL (ref 0–1)
MONOCYTES NFR BLD: 7 % (ref 5–13)
NEUTS SEG # BLD: 3.6 K/UL (ref 1.8–8)
NEUTS SEG NFR BLD: 54 % (ref 32–75)
NRBC # BLD: 0 K/UL (ref 0–0.01)
NRBC BLD-RTO: 0 PER 100 WBC
PLATELET # BLD AUTO: 233 K/UL (ref 150–400)
PMV BLD AUTO: 10 FL (ref 8.9–12.9)
POTASSIUM SERPL-SCNC: 4.4 MMOL/L (ref 3.5–5.1)
PROT SERPL-MCNC: 6.9 G/DL (ref 6.4–8.2)
RBC # BLD AUTO: 4.42 M/UL (ref 3.8–5.2)
SODIUM SERPL-SCNC: 140 MMOL/L (ref 136–145)
T4 FREE SERPL-MCNC: 0.9 NG/DL (ref 0.8–1.5)
TRIGL SERPL-MCNC: 318 MG/DL (ref ?–150)
TSH SERPL DL<=0.05 MIU/L-ACNC: 4.7 UIU/ML (ref 0.36–3.74)
VLDLC SERPL CALC-MCNC: 63.6 MG/DL
WBC # BLD AUTO: 6.7 K/UL (ref 3.6–11)

## 2022-07-10 DIAGNOSIS — E03.9 ACQUIRED HYPOTHYROIDISM: ICD-10-CM

## 2022-07-10 RX ORDER — LEVOTHYROXINE SODIUM 50 UG/1
50 TABLET ORAL DAILY
Qty: 90 TABLET | Refills: 1 | Status: SHIPPED | OUTPATIENT
Start: 2022-07-10 | End: 2022-10-11 | Stop reason: SDUPTHER

## 2022-07-11 ENCOUNTER — TELEPHONE (OUTPATIENT)
Dept: SURGERY | Age: 42
End: 2022-07-11

## 2022-07-11 NOTE — TELEPHONE ENCOUNTER
Called patient regarding referral to our [address] [place] [address] [facility]. Patient did not answer so I left a vmail with our contact information.

## 2022-07-25 ENCOUNTER — TELEPHONE (OUTPATIENT)
Dept: SURGERY | Age: 42
End: 2022-07-25

## 2022-07-25 NOTE — TELEPHONE ENCOUNTER
Called patient regarding referral to our [address] [place] [address] [facility]. Patient did not answer so I left a vmail with our contact information.

## 2022-08-17 DIAGNOSIS — Z76.89 ENCOUNTER FOR WEIGHT MANAGEMENT: Primary | ICD-10-CM

## 2022-08-17 DIAGNOSIS — E66.01 MORBID OBESITY (HCC): ICD-10-CM

## 2022-08-18 NOTE — PROGRESS NOTES
Patient attended our VIRTUAL Medically Supervised Weight Loss New Patient Orientation on Wednesday August 17, 2022 where we discussed:  New Direction Very Low and the Low Calorie diet details  Medical Supervision  Nutrition education  Cost of Meal Replacements

## 2022-08-22 DIAGNOSIS — E03.9 ACQUIRED HYPOTHYROIDISM: ICD-10-CM

## 2022-08-23 RX ORDER — LEVOTHYROXINE SODIUM 25 UG/1
TABLET ORAL
Qty: 30 TABLET | Refills: 0 | OUTPATIENT
Start: 2022-08-23

## 2022-09-06 ENCOUNTER — APPOINTMENT (OUTPATIENT)
Dept: PHYSICAL THERAPY | Age: 42
End: 2022-09-06

## 2022-09-13 ENCOUNTER — HOSPITAL ENCOUNTER (OUTPATIENT)
Dept: PHYSICAL THERAPY | Age: 42
Discharge: HOME | End: 2022-09-13
Payer: MEDICAID

## 2022-09-13 PROCEDURE — 97110 THERAPEUTIC EXERCISES: CPT | Performed by: PHYSICAL THERAPIST

## 2022-09-13 PROCEDURE — 97161 PT EVAL LOW COMPLEX 20 MIN: CPT | Performed by: PHYSICAL THERAPIST

## 2022-09-13 NOTE — PROGRESS NOTES
PT INITIAL EVALUATION NOTE - [facility] 2-15    Patient Name: Abiola Felipe  Date:2022  : 1980  [x]  Patient  Verified  Payor: Cierra Cristobal / Plan: [id number] / Product Type: Managed Care Medicaid /    In time:1040 AM  Out time:1115 AM  Total Treatment Time (min): 35  Total Timed Codes (min): 15  1:1 Treatment Time ( only): 15   Visit #: 1     Treatment Area: Pain in right foot [M79.671]    SUBJECTIVE  Pain Level (0-10 scale): current 4 worst 6 best 0   Any medication changes, allergies to medications, adverse drug reactions, diagnosis change, or new procedure performed?: [] No    [x] Yes (see summary sheet for update)  Subjective:    Patient referred to PT with a chief complaint of B foot. She has been here for PT in the past but did not return for follow up appointments. States that she is not doing any of the exercises. She bought new shoes which helped some. She has been doing some walking around the house and when she walks too much she has pain. States that she has a lot of pain in her feet. Pain Location: B feet entire foot pain   Pain Description: pulsing pain   Paresthesias: some numbness not so much   Aggravating Factors: standing and walking  30 min to 1 hour   Relieving factors: rest, sit  Current Functional Limitations: pain with standing and walking   PLOF: Longstanding history of foot pain > 3-4 years, 1 year of increased pain   Mechanism of Injury: insidious   Previous Treatment/Compliance: injection, steroids   PMHx/Surgical Hx: Anxiety, OA, thyroid, HTN   Work Hx: not working   Living Situation: Lives with 3 children   Pt Goals: \"To be able to have some exercises and do them for the pain to go away. \"   Barriers: language  Motivation: good           OBJECTIVE/EXAMINATION  Observation: B hallux valgus mild     Balance: R SLS 3 sec L SLS 2 sec       Edema: minimal     ROM:       Ankle AROM:   R Ankle DF: lacking 8 degrees  R Ankle PF: 40 degrees  R Ankle inversion: 30 degrees  R Ankle eversion: 15 degrees     L Ankle DF: lacking 8 degrees   L Ankle PF: 40 degrees  L Ankle inversion: 30 degrees   L Ankle eversion: 15 degrees     Great toe extension PROM 50 degrees     Ankle PROM:   R Ankle DF: 5 degrees     L Ankle DF: 5 degrees        Strength:    R Ankle DF 4+/5  R Ankle inversion 4+/5  R Ankle eversion 4+/5    L Ankle DF 4+/5  L Ankle inversion 4+/5  L Ankle eversion 4+/5      Joint mobility: hypomobile metatarsal glides B         10 min Therapeutic Exercise:  [x] See flow sheet : towel curl, toe yoga   Rationale: increase strength to improve the patients ability to perform ADLs       5 min Manual Therapy: metatarsal glides B feet, gentle great to ext stretch B     Rationale: decrease pain and increase tissue extensibility to improve the patients ability to ambulate            With   [x] TE   [] TA   [] neuro   [] other: Patient Education: [x] Review HEP    [] Progressed/Changed HEP based on:   [] positioning   [] body mechanics   [] transfers   [] heat/ice application    [x] other: Recumbent bike or elliptical 3 days per week 10 minutes at a time, can walk for exercise if not painful, role of PT, expected course of PT, importance of HEP compliance      Other Objective/Functional Measures:NT    Pain Level (0-10 scale) post treatment: 4    ASSESSMENT/Changes in Function:     [x]  See Plan of [address], PT 9/13/2022  10:38 AM

## 2022-09-13 NOTE — PROGRESS NOTES
[employer] [facility]  [address] [facility], [facility]  Phone: [phone]  Fax: [phone]    Plan of Care/Statement of Necessity for Physical Therapy Services  2-15    Patient name: Daniella Ledezma  : 1980  Provider#: [id number]  Referral source: Marcelino Scott NP      Medical/Treatment Diagnosis: Pain in right foot [M79.671]     Prior Hospitalization: see medical history     Comorbidities: Anxiety, OA, thyroid, HTN   Prior Level of Function: Longstanding history of foot pain > 3-4 years, 1 year of increased pain   Medications: Verified on Patient Summary List  Start of Care: 20      Onset Date: 3-4 years ago   The Plan of Care and following information is based on the information from the initial evaluation. Assessment/ key information: Patient presents with B foot pain with limited ROM and decreased joint mobility limiting ability to perform functional activities such as standing and walking. She would benefit from skilled PT to increase B foot strength and ROM to decrease pain so she can return to prior level of function.      Evaluation Complexity History MEDIUM  Complexity : 1-2 comorbidities / personal factors will impact the outcome/ POC ; Examination LOW Complexity : 1-2 Standardized tests and measures addressing body structure, function, activity limitation and / or participation in recreation  ;Presentation LOW Complexity : Stable, uncomplicated  ;Clinical Decision Making LOW Complexity : FOTO score of   Overall Complexity Rating: LOW     Problem List: pain affecting function, decrease ROM, decrease strength, impaired gait/ balance, decrease ADL/ functional abilitiies, and decrease activity tolerance   Treatment Plan may include any combination of the following: Therapeutic exercise, Therapeutic activities, Neuromuscular re-education, Physical agent/modality, Gait/balance training, Manual therapy, and Patient education  Patient / Family readiness to learn indicated by: asking questions, trying to perform skills, and interest  Persons(s) to be included in education: patient (P) and family support person (FSP);list daughter  Barriers to Learning/Limitations: yes;  language  Patient Goal (s): To be able to have some exercises and do them for the pain to go away  Patient Self Reported Health Status: good  Rehabilitation Potential: good    Short Term Goals: To be accomplished in 2 weeks:    1. Patient will be I in HEP to promote self management of symptoms. 2. Patient will report pain level at worst as less than or equal to 5/10 so they can perform ADLs without pain. Long Term Goals: To be accomplished in 4-6 weeks:    1. Patient will report pain level at worst as less than or equal to 3/10 so they can perform ADLs without pain. 2. Patient will be able to stand > or = 30 min without limitation from foot pain. 3. Patient will be able to ambulate > or = 3000 feet without limitation from foot pain. Frequency / Duration: Patient to be seen 1 times per week for 4 weeks. Patient/ Caregiver education and instruction: exercises    [x]  Plan of care has been reviewed with CAMILLA Welsh, PT 9/13/2022 11:28 AM    ________________________________________________________________________    I certify that the above Therapy Services are being furnished while the patient is under my care. I agree with the treatment plan and certify that this therapy is necessary.     [de-identified] Signature:____________________  Date:____________Time: _________

## 2022-09-20 ENCOUNTER — HOSPITAL ENCOUNTER (OUTPATIENT)
Dept: PHYSICAL THERAPY | Age: 42
Discharge: HOME | End: 2022-09-20
Payer: MEDICAID

## 2022-09-20 PROCEDURE — 97110 THERAPEUTIC EXERCISES: CPT | Performed by: PHYSICAL THERAPIST

## 2022-09-20 PROCEDURE — 97140 MANUAL THERAPY 1/> REGIONS: CPT | Performed by: PHYSICAL THERAPIST

## 2022-09-20 NOTE — PROGRESS NOTES
PT DAILY TREATMENT NOTE - [facility] 2-15    Patient Name: Marco Lorenzo  Date:2022  : 1980  [x]  Patient  Verified  Payor: Johnnie Bonds / Plan: [id number] [employer] / Product Type: Managed Care Medicaid /    In time:305 PM  Out time:345 PM  Total Treatment Time (min): 40  Total Timed Codes (min): 40   Visit #:  2    Treatment Area: Pain in right foot [M79.161]    SUBJECTIVE  Pain Level (0-10 scale): 3  Any medication changes, allergies to medications, adverse drug reactions, diagnosis change, or new procedure performed?: [x] No    [] Yes (see summary sheet for update)  Subjective functional status/changes:   [] No changes reported  Patient reports 3/10 foot pain. States that she is trying to work on her exercises, but she has been very busy. She does them at least once a day. OBJECTIVE      35 min Therapeutic Exercise:  [x] See flow sheet : progressed per flow sheet    Rationale: increase ROM and increase strength to improve the patients ability to perform ADLs      10 min Manual Therapy: STM B extensor hallucis longus , metatarsal glides    Rationale: decrease pain, increase ROM, and increase tissue extensibility to improve the patients ability to perform ADLs             With   [x] TE   [] TA   [] neuro   [] other: Patient Education: [x] Review HEP    [] Progressed/Changed HEP based on:   [] positioning   [] body mechanics   [] transfers   [] heat/ice application    [] other:      Other Objective/Functional Measures: NT     Pain Level (0-10 scale) post treatment: 3    ASSESSMENT/Changes in Function:   Patient tolerated exercise progression well today. Patient will continue to benefit from skilled PT services to modify and progress therapeutic interventions, address functional mobility deficits, address ROM deficits, address strength deficits, analyze and address soft tissue restrictions, and analyze and cue movement patterns to attain remaining goals.             Progress towards goals / Updated goals:  Short Term Goals: To be accomplished in 2 weeks:               1. Patient will be I in HEP to promote self management of symptoms. PROGRESSING              2. Patient will report pain level at worst as less than or equal to 5/10 so they can perform ADLs without pain. PROGRESSING  Long Term Goals: To be accomplished in 4-6 weeks:               1.  Patient will report pain level at worst as less than or equal to 3/10 so they can perform ADLs without pain. 2. Patient will be able to stand > or = 30 min without limitation from foot pain.                3. Patient will be able to ambulate > or = 3000 feet without limitation from foot pain    PLAN  [x]  Upgrade activities as tolerated     [x]  Continue plan of care  []  Update interventions per flow sheet       []  Discharge due to:_  []  Other:_      Kentrell Kessler, PT 9/20/2022

## 2022-10-03 ENCOUNTER — HOSPITAL ENCOUNTER (OUTPATIENT)
Dept: PHYSICAL THERAPY | Age: 42
Discharge: HOME | End: 2022-10-03
Payer: MEDICAID

## 2022-10-03 PROCEDURE — 97140 MANUAL THERAPY 1/> REGIONS: CPT

## 2022-10-03 PROCEDURE — 97110 THERAPEUTIC EXERCISES: CPT

## 2022-10-03 NOTE — PROGRESS NOTES
PT DAILY TREATMENT NOTE - [facility] -15    Patient Name: Gera Cazares  Date:10/3/2022  : 1980  [x]  Patient  Verified  Payor: Othelia Galeazzi / Plan: [facility] MEDICAID / Product Type: Managed Care Medicaid /    In time: 330 PM  Out time: 405 PM  Total Treatment Time (min): 35  Total Timed Codes (min):  35  Visit #:  3    Treatment Area: Pain in right foot [M79.671]    SUBJECTIVE  Pain Level (0-10 scale): 3  Any medication changes, allergies to medications, adverse drug reactions, diagnosis change, or new procedure performed?: [x] No    [] Yes (see summary sheet for update)  Subjective functional status/changes:   [] No changes reported  Patient reports pain in right medial foot and \"all over\" left foot. Pt states she does her exercises \"some. \"    OBJECTIVE      25 min Therapeutic Exercise:  [x] See flow sheet : progressed per flow sheet    Rationale: increase ROM and increase strength to improve the patients ability to perform ADLs      10 min Manual Therapy: Bilateral: first toe stretch, metatarsal glides    Rationale: decrease pain, increase ROM, and increase tissue extensibility to improve the patients ability to perform ADLs             With   [x] TE   [] TA   [] neuro   [] other: Patient Education: [x] Review HEP    [] Progressed/Changed HEP based on:   [] positioning   [] body mechanics   [] transfers   [] heat/ice application    [] other:      Other Objective/Functional Measures: NT     Pain Level (0-10 scale) post treatment: 3    ASSESSMENT/Changes in Function:   Patient challenged by added seated rocker board plantar/dorsiflexion. Added seated HR/TR to HEP, pt demonstrated good understanding. Tolerated manual techniques well this date.   Patient will continue to benefit from skilled PT services to modify and progress therapeutic interventions, address functional mobility deficits, address ROM deficits, address strength deficits, analyze and address soft tissue restrictions, and analyze and cue movement patterns to attain remaining goals. Progress towards goals / Updated goals:  Short Term Goals: To be accomplished in 2 weeks:               1. Patient will be I in HEP to promote self management of symptoms. PROGRESSING              2. Patient will report pain level at worst as less than or equal to 5/10 so they can perform ADLs without pain. PROGRESSING  Long Term Goals: To be accomplished in 4-6 weeks:               1.  Patient will report pain level at worst as less than or equal to 3/10 so they can perform ADLs without pain. 2. Patient will be able to stand > or = 30 min without limitation from foot pain.                3. Patient will be able to ambulate > or = 3000 feet without limitation from foot pain    PLAN  [x]  Upgrade activities as tolerated     [x]  Continue plan of care  []  Update interventions per flow sheet       []  Discharge due to:_  []  Other:_      Radha Patel PTA 10/3/2022

## 2022-10-10 ENCOUNTER — OFFICE VISIT (OUTPATIENT)
Dept: FAMILY MEDICINE CLINIC | Age: 42
End: 2022-10-10
Payer: MEDICAID

## 2022-10-10 VITALS
DIASTOLIC BLOOD PRESSURE: 82 MMHG | HEART RATE: 80 BPM | RESPIRATION RATE: 16 BRPM | OXYGEN SATURATION: 96 % | BODY MASS INDEX: 47.22 KG/M2 | SYSTOLIC BLOOD PRESSURE: 128 MMHG | TEMPERATURE: 98.6 F | WEIGHT: 276.6 LBS | HEIGHT: 64 IN

## 2022-10-10 DIAGNOSIS — E03.9 ACQUIRED HYPOTHYROIDISM: Primary | ICD-10-CM

## 2022-10-10 DIAGNOSIS — Z12.11 ENCOUNTER FOR SCREENING COLONOSCOPY: ICD-10-CM

## 2022-10-10 PROCEDURE — 99214 OFFICE O/P EST MOD 30 MIN: CPT | Performed by: NURSE PRACTITIONER

## 2022-10-10 NOTE — PROGRESS NOTES
Assessment/Plan:     Diagnoses and all orders for this visit:    1. Acquired hypothyroidism  -     TSH 3RD GENERATION; Future  Continue Levothyroxine. Recheck labs     2. Encounter for screening colonoscopy  -     REFERRAL TO GASTROENTEROLOGY    Time: 30-39 minutes was spent with this patient face to face discussing  diagnoses, risk factors and treatment with greater than 50% of this time was spent in counseling and coordination of care. Follow-up and Dispositions    Return in about 3 months (around 1/10/2023) for Follow Up. Discussed expected course/resolution/complications of diagnosis in detail with patient. Medication risks/benefits/costs/interactions/alternatives discussed with patient. Pt was given after visit summary which includes diagnoses, current medications & vitals. Pt expressed understanding with the diagnosis and plan          Subjective:      Tone Jimenez is a 38 y.o. female who presents for had concerns including Follow-up. She is accompanied by her daughter who assists with translation. Endocrine Review  Patient is seen for followup of hypothyroidism. Since last visit: no changes. She reports medication compliance: all the time and is taking separate from all her other meds. She reports the following concerns/problems/med side effects: none. Lab review: orders written for new lab studies as appropriate; see orders. Patient Active Problem List   Diagnosis Code    Dizziness R42    HA (headache) R51.9    Depression F32. A    Obesity, morbid (HCC) E66.01    DOUGLAS (generalized anxiety disorder) F41.1    Acquired hypothyroidism E03.9    H. pylori infection A04.8    Throat pain R07.0    Acute otitis externa of left ear H60.502    Shortness of breath R06.02    Left leg pain M79.605    Pelvic pain R10.2    Hyperlipidemia LDL goal <100 E78.5    ZACH (obstructive sleep apnea) G47.33       Current Outpatient Medications   Medication Sig Dispense Refill    polyethylene glycol (MIRALAX) 17 gram packet Take 1 Packet by mouth daily. 30 Packet 0    levothyroxine (SYNTHROID) 100 mcg tablet Take 1 Tablet by mouth daily. 30 Tablet 3       No Known Allergies    ROS:   Review of Systems   Constitutional:  Negative for malaise/fatigue. Eyes:  Negative for blurred vision. Respiratory:  Negative for shortness of breath. Cardiovascular:  Negative for chest pain. Objective:   Visit Vitals  /82 (BP 1 Location: Left upper arm, BP Patient Position: Sitting, BP Cuff Size: Large adult long)   Pulse 80   Temp 98.6 °F (37 °C)   Resp 16   Ht 5' 4\" (1.626 m)   Wt 276 lb 9.6 oz (125.5 kg)   SpO2 96%   BMI 47.48 kg/m²       Vitals and Nurse Documentation reviewed. Physical Exam  Constitutional:       General: She is not in acute distress. Appearance: She is obese. Cardiovascular:      Heart sounds: S1 normal and S2 normal. No murmur heard. No friction rub. No gallop. Pulmonary:      Effort: No respiratory distress. Breath sounds: Normal breath sounds. Skin:     General: Skin is warm and dry.    Psychiatric:         Mood and Affect: Mood and affect normal.

## 2022-10-10 NOTE — PROGRESS NOTES
Chief Complaint   Patient presents with    Follow-up       1. \"Have you been to the ER, urgent care clinic since your last visit? Hospitalized since your last visit? \" No    2. \"Have you seen or consulted any other health care providers outside of the [address] [place] [address] since your last visit? \" No     3. For patients over 45: Has the patient had a colonoscopy? Yes - no Care Gap present     If the patient is female:    4. For patients over 40: Has the patient had a mammogram? Yes - no Care Gap present    5. For patients over 21: Has the patient had a pap smear?  Yes - no Care Gap present    3 most recent PHQ Screens 10/10/2022   Little interest or pleasure in doing things Not at all   Feeling down, depressed, irritable, or hopeless Not at all   Total Score PHQ 2 0     Health Maintenance Due   Topic Date Due    COVID-19 Vaccine (1) Never done    Flu Vaccine (1) 08/01/2022

## 2022-10-11 ENCOUNTER — APPOINTMENT (OUTPATIENT)
Dept: PHYSICAL THERAPY | Age: 42
End: 2022-10-11
Payer: MEDICAID

## 2022-10-11 DIAGNOSIS — E03.9 ACQUIRED HYPOTHYROIDISM: ICD-10-CM

## 2022-10-11 LAB — TSH SERPL DL<=0.05 MIU/L-ACNC: 4.8 UIU/ML (ref 0.36–3.74)

## 2022-10-11 RX ORDER — LEVOTHYROXINE SODIUM 100 UG/1
100 TABLET ORAL DAILY
Qty: 30 TABLET | Refills: 3 | Status: SHIPPED | OUTPATIENT
Start: 2022-10-11

## 2022-10-13 ENCOUNTER — APPOINTMENT (OUTPATIENT)
Dept: PHYSICAL THERAPY | Age: 42
End: 2022-10-13
Payer: MEDICAID

## 2022-10-17 ENCOUNTER — HOSPITAL ENCOUNTER (OUTPATIENT)
Dept: PHYSICAL THERAPY | Age: 42
Discharge: HOME | End: 2022-10-17
Payer: MEDICAID

## 2022-10-17 PROCEDURE — 97110 THERAPEUTIC EXERCISES: CPT

## 2022-10-17 PROCEDURE — 97140 MANUAL THERAPY 1/> REGIONS: CPT

## 2022-10-17 NOTE — PROGRESS NOTES
PT DAILY TREATMENT NOTE - [facility] 2-15    Patient Name: Ruby Scott  Date:10/17/2022  : 1980  [x]  Patient  Verified  Payor: Savi Crabtreear / Plan: [employer] / Product Type: Managed Care Medicaid /    In time: 367 PM  Out time: 540 PM  Total Treatment Time (min): 35  Total Timed Codes (min):  35  Visit #:  4    Treatment Area: Pain in right foot [M79.859]    SUBJECTIVE  Pain Level (0-10 scale): 3  Any medication changes, allergies to medications, adverse drug reactions, diagnosis change, or new procedure performed?: [x] No    [] Yes (see summary sheet for update)  Subjective functional status/changes:   [] No changes reported  Patient reports pain in right medial foot at bunion location. Pt stated pain sometimes travels up through gastroc muscle. OBJECTIVE      25 min Therapeutic Exercise:  [x] See flow sheet : progressed per flow sheet    Rationale: increase ROM and increase strength to improve the patients ability to perform ADLs      10 min Manual Therapy: Bilateral: first toe stretch, metatarsal glides, dorsiflexion stretch   Rationale: decrease pain, increase ROM, and increase tissue extensibility to improve the patients ability to perform ADLs             With   [x] TE   [] TA   [] neuro   [] other: Patient Education: [x] Review HEP    [] Progressed/Changed HEP based on:   [] positioning   [] body mechanics   [] transfers   [] heat/ice application    [] other:      Other Objective/Functional Measures: NT     Pain Level (0-10 scale) post treatment: \"better\"    ASSESSMENT/Changes in Function:   Patient with increased pain and tightness in bilateral medial gastroc muscles, challenged by long sitting calf stretch. Tolerated manual techniques well this date. Reviewed HEP and pt demonstrated good understanding.   Patient will continue to benefit from skilled PT services to modify and progress therapeutic interventions, address functional mobility deficits, address ROM deficits, address strength deficits, analyze and address soft tissue restrictions, and analyze and cue movement patterns to attain remaining goals. Progress towards goals / Updated goals:  Short Term Goals: To be accomplished in 2 weeks:               1. Patient will be I in HEP to promote self management of symptoms. PROGRESSING              2. Patient will report pain level at worst as less than or equal to 5/10 so they can perform ADLs without pain. PROGRESSING  Long Term Goals: To be accomplished in 4-6 weeks:               1.  Patient will report pain level at worst as less than or equal to 3/10 so they can perform ADLs without pain. 2. Patient will be able to stand > or = 30 min without limitation from foot pain.                3. Patient will be able to ambulate > or = 3000 feet without limitation from foot pain    PLAN  [x]  Upgrade activities as tolerated     [x]  Continue plan of care  []  Update interventions per flow sheet       []  Discharge due to:_  []  Other:_      Delia Garcia, PTA 10/17/2022

## 2022-10-18 ENCOUNTER — APPOINTMENT (OUTPATIENT)
Dept: PHYSICAL THERAPY | Age: 42
End: 2022-10-18
Payer: MEDICAID

## 2022-10-19 ENCOUNTER — HOSPITAL ENCOUNTER (OUTPATIENT)
Dept: PHYSICAL THERAPY | Age: 42
Discharge: HOME | End: 2022-10-19
Payer: MEDICAID

## 2022-10-19 PROCEDURE — 97110 THERAPEUTIC EXERCISES: CPT

## 2022-10-19 PROCEDURE — 97140 MANUAL THERAPY 1/> REGIONS: CPT

## 2022-10-19 NOTE — PROGRESS NOTES
PT DAILY TREATMENT NOTE - [facility] -15    Patient Name: Mary Arrieta  Date:10/19/2022  : 1980  [x]  Patient  Verified  Payor: Gianfranco Larsen / Plan: [facility] MEDICAID / Product Type: Managed Care Medicaid /    In time: 789 PM  Out time: 4:50 PM  Total Treatment Time (min): 35  Total Timed Codes (min):  35  Visit #:  5    Treatment Area: Pain in right foot [M79.451]    SUBJECTIVE  Pain Level (0-10 scale): 3  Any medication changes, allergies to medications, adverse drug reactions, diagnosis change, or new procedure performed?: [x] No    [] Yes (see summary sheet for update)  Subjective functional status/changes:   [] No changes reported  Patient reports no increase in pain since last visit, but was unable to walk a lot due to busy schedule. OBJECTIVE      25 min Therapeutic Exercise:  [x] See flow sheet : progressed per flow sheet    Rationale: increase ROM and increase strength to improve the patients ability to perform ADLs      10 min Manual Therapy: Bilateral: first toe stretch, metatarsal glides, dorsiflexion stretch   Rationale: decrease pain, increase ROM, and increase tissue extensibility to improve the patients ability to perform ADLs             With   [x] TE   [] TA   [] neuro   [] other: Patient Education: [x] Review HEP    [] Progressed/Changed HEP based on:   [] positioning   [] body mechanics   [] transfers   [] heat/ice application    [] other:      Other Objective/Functional Measures: NT     Pain Level (0-10 scale) post treatment: \"better\"    ASSESSMENT/Changes in Function:   Pt challenged by AROM ankle circles. Added BAPS board ankle circles with assist to improve ROM. Tolerated manual techniques well this date.      Patient will continue to benefit from skilled PT services to modify and progress therapeutic interventions, address functional mobility deficits, address ROM deficits, address strength deficits, analyze and address soft tissue restrictions, and analyze and cue movement patterns to attain remaining goals. Progress towards goals / Updated goals:  Short Term Goals: To be accomplished in 2 weeks:               1. Patient will be I in HEP to promote self management of symptoms. PROGRESSING              2. Patient will report pain level at worst as less than or equal to 5/10 so they can perform ADLs without pain. PROGRESSING  Long Term Goals: To be accomplished in 4-6 weeks:               1.  Patient will report pain level at worst as less than or equal to 3/10 so they can perform ADLs without pain. 2. Patient will be able to stand > or = 30 min without limitation from foot pain.                3. Patient will be able to ambulate > or = 3000 feet without limitation from foot pain    PLAN  [x]  Upgrade activities as tolerated     [x]  Continue plan of care  []  Update interventions per flow sheet       []  Discharge due to:_  []  Other:_      Anibal Jefferson, CAMILLA 10/19/2022

## 2022-10-24 ENCOUNTER — HOSPITAL ENCOUNTER (OUTPATIENT)
Dept: PHYSICAL THERAPY | Age: 42
Discharge: HOME | End: 2022-10-24
Payer: MEDICAID

## 2022-10-24 PROCEDURE — 97140 MANUAL THERAPY 1/> REGIONS: CPT

## 2022-10-24 PROCEDURE — 97110 THERAPEUTIC EXERCISES: CPT

## 2022-10-24 NOTE — PROGRESS NOTES
PT DAILY TREATMENT NOTE - [facility] 2-15    Patient Name: Cookie Thomas  Date:10/24/2022  : 1980  [x]  Patient  Verified  Payor: Rafal Mead / Plan: [employer] MEDICAID / Product Type: Managed Care Medicaid /    In time: 4:40 PM  Out time: 5:15 PM  Total Treatment Time (min): 35  Total Timed Codes (min):  35  Visit #:  6    Treatment Area: Pain in right foot [M79.671]    SUBJECTIVE  Pain Level (0-10 scale): 3  Any medication changes, allergies to medications, adverse drug reactions, diagnosis change, or new procedure performed?: [x] No    [] Yes (see summary sheet for update)  Subjective functional status/changes:   [] No changes reported  Patient reports improved foot pain, but has pain and numbness in bilateral hands. OBJECTIVE      25 min Therapeutic Exercise:  [x] See flow sheet : progressed per flow sheet    Rationale: increase ROM and increase strength to improve the patients ability to perform ADLs      10 min Manual Therapy: Bilateral: first toe stretch, metatarsal glides, dorsiflexion stretch   Rationale: decrease pain, increase ROM, and increase tissue extensibility to improve the patients ability to perform ADLs             With   [x] TE   [] TA   [] neuro   [] other: Patient Education: [x] Review HEP    [] Progressed/Changed HEP based on:   [] positioning   [] body mechanics   [] transfers   [] heat/ice application    [] other:      Other Objective/Functional Measures: NT     Pain Level (0-10 scale) post treatment: \"better\"    ASSESSMENT/Changes in Function:   Progressed to standing HR/TR, tolerated without pain. Tolerated manual techniques well this date. Patient will continue to benefit from skilled PT services to modify and progress therapeutic interventions, address functional mobility deficits, address ROM deficits, address strength deficits, analyze and address soft tissue restrictions, and analyze and cue movement patterns to attain remaining goals.             Progress towards goals / Updated goals:  Short Term Goals: To be accomplished in 2 weeks:               1. Patient will be I in HEP to promote self management of symptoms. PROGRESSING              2. Patient will report pain level at worst as less than or equal to 5/10 so they can perform ADLs without pain. PROGRESSING  Long Term Goals: To be accomplished in 4-6 weeks:               1.  Patient will report pain level at worst as less than or equal to 3/10 so they can perform ADLs without pain. 2. Patient will be able to stand > or = 30 min without limitation from foot pain.                3. Patient will be able to ambulate > or = 3000 feet without limitation from foot pain    PLAN  [x]  Upgrade activities as tolerated     [x]  Continue plan of care  []  Update interventions per flow sheet       []  Discharge due to:_  []  Other:_      Anibal Jefferson PTA 10/24/2022

## 2022-11-01 ENCOUNTER — HOSPITAL ENCOUNTER (OUTPATIENT)
Dept: PHYSICAL THERAPY | Age: 42
Discharge: HOME | End: 2022-11-01
Payer: MEDICAID

## 2022-11-01 PROCEDURE — 97110 THERAPEUTIC EXERCISES: CPT

## 2022-11-01 PROCEDURE — 97140 MANUAL THERAPY 1/> REGIONS: CPT

## 2022-11-01 NOTE — PROGRESS NOTES
PT DAILY TREATMENT NOTE - [facility] 2-15    Patient Name: Patricia Paez  Date:2022  : 1980  [x]  Patient  Verified  Payor: Jodi Salgado / Plan: Earnest Pineda / Product Type: Managed Care Medicaid /    In time: 12:30 PM  Out time: 1:15 PM  Total Treatment Time (min): 45  Total Timed Codes (min):  45  Visit #:  7    Treatment Area: Pain in right foot [M79.541]    SUBJECTIVE  Pain Level (0-10 scale): 3, L>R  Any medication changes, allergies to medications, adverse drug reactions, diagnosis change, or new procedure performed?: [x] No    [] Yes (see summary sheet for update)  Subjective functional status/changes:   [] No changes reported  Patient presents w/ daughter that assists with translation/communication; reports L foot more painful than R today; reports that she experiences increased pain w/ prolonged standing in kitchen, notes that she does not typically wear footwear when in the home     OBJECTIVE      35 min Therapeutic Exercise:  [x] See flow sheet : progressed per flow sheet    Rationale: increase ROM and increase strength to improve the patients ability to perform ADLs      10 min Manual Therapy: Bilateral: first toe stretch, metatarsal glides, dorsiflexion stretch   Rationale: decrease pain, increase ROM, and increase tissue extensibility to improve the patients ability to perform ADLs             With   [x] TE   [] TA   [] neuro   [] other: Patient Education: [x] Review HEP    [] Progressed/Changed HEP based on:   [] positioning   [] body mechanics   [] transfers   [] heat/ice application    [x] other: discussed recommendation for in home footwear with patient and patients daughter; advised that she have shoe with good support to wear in home as she spends prolonged periods standing; discussed purchasing a second pair of her current outdoor shoes and or going to either [employer] or [employer] so that she could try on shoes for fit/support      Other Objective/Functional Measures: NT     Pain Level (0-10 scale) post treatment: 2    ASSESSMENT/Changes in Function:     Pt tolerated standing exercises well however required contact guard assist to min assist for balance w/ getting on/off of wedge and rocker board; performed well w/ SLS     Pt and pts daughter demonstrated recommendations for footwear in the home as described in patient education     Patient will continue to benefit from skilled PT services to modify and progress therapeutic interventions, address functional mobility deficits, address ROM deficits, address strength deficits, analyze and address soft tissue restrictions, and analyze and cue movement patterns to attain remaining goals. Progress towards goals / Updated goals:  Short Term Goals: To be accomplished in 2 weeks:               1. Patient will be I in HEP to promote self management of symptoms. PROGRESSING              2. Patient will report pain level at worst as less than or equal to 5/10 so they can perform ADLs without pain. PROGRESSING  Long Term Goals: To be accomplished in 4-6 weeks:               1.  Patient will report pain level at worst as less than or equal to 3/10 so they can perform ADLs without pain. 2. Patient will be able to stand > or = 30 min without limitation from foot pain.                3. Patient will be able to ambulate > or = 3000 feet without limitation from foot pain    PLAN  [x]  Upgrade activities as tolerated     [x]  Continue plan of care  []  Update interventions per flow sheet       []  Discharge due to:_  []  Other:_      Vickie Crowell, PT, PTA 11/1/2022

## 2022-11-03 ENCOUNTER — APPOINTMENT (OUTPATIENT)
Dept: PHYSICAL THERAPY | Age: 42
End: 2022-11-03
Payer: MEDICAID

## 2022-11-08 ENCOUNTER — HOSPITAL ENCOUNTER (OUTPATIENT)
Dept: PHYSICAL THERAPY | Age: 42
Discharge: HOME | End: 2022-11-08
Payer: MEDICAID

## 2022-11-08 PROCEDURE — 97140 MANUAL THERAPY 1/> REGIONS: CPT | Performed by: PHYSICAL THERAPIST

## 2022-11-08 PROCEDURE — 97110 THERAPEUTIC EXERCISES: CPT | Performed by: PHYSICAL THERAPIST

## 2022-11-08 NOTE — PROGRESS NOTES
PT DAILY TREATMENT NOTE/PROGRESS NOTE - [facility] 2-15    Patient Name: Tone Jimenez  Date:2022  : 1980  [x]  Patient  Verified  Payor: Amarilis Todd / Plan: [facility] MEDICAID / Product Type: Managed Care Medicaid /    In time: 12:00 PM  Out time: 1240 PM  Total Treatment Time (min): 40  Total Timed Codes (min):  40  Visit #:  8    Treatment Area: Pain in right foot [M79.301]    SUBJECTIVE  Pain Level (0-10 scale):  Current 5-6 worst 10   Any medication changes, allergies to medications, adverse drug reactions, diagnosis change, or new procedure performed?: [x] No    [] Yes (see summary sheet for update)  Subjective functional status/changes:   [] No changes reported  Patient presents w/ daughter that assists with translation/communication. Patient reports 30% improvement in symptoms since beginning therapy.      OBJECTIVE  Observation: B hallux valgus mild      Balance: R SLS 3 sec L SLS 4 sec         Edema: minimal      ROM:         Ankle AROM:   R Ankle DF: lacking 5degrees  R Ankle PF: 40 degrees  R Ankle inversion: 30 degrees  R Ankle eversion: 15 degrees     L Ankle DF: lacking 5 degrees   L Ankle PF: 40 degrees  L Ankle inversion: 30 degrees   L Ankle eversion: 15 degrees      Great toe extension PROM 50 degrees      Ankle PROM:   R Ankle DF: 5 degrees      L Ankle DF: 5 degrees           Strength:     R Ankle DF 4+/5  R Ankle inversion 4+/5  R Ankle eversion 4+/5     L Ankle DF 4+/5  L Ankle inversion 4+/5  L Ankle eversion 4+/5        Joint mobility: hypomobile metatarsal glides B     30 min Therapeutic Exercise:  [x] See flow sheet : progressed per flow sheet    Rationale: increase ROM and increase strength to improve the patients ability to perform ADLs      10 min Manual Therapy: Bilateral: first toe stretch, metatarsal glides, dorsiflexion stretch   Rationale: decrease pain, increase ROM, and increase tissue extensibility to improve the patients ability to perform ADLs With   [x] TE   [] TA   [] neuro   [] other: Patient Education: [x] Review HEP    [] Progressed/Changed HEP based on:   [] positioning   [] body mechanics   [] transfers   [] heat/ice application    [] other: discussed recommendation for in home footwear with patient and patients daughter; advised that she have shoe with good support to wear in home as she spends prolonged periods standing; discussed purchasing a second pair of her current outdoor shoes and or going to either [employer] or [employer] so that she could try on shoes for fit/support      Other Objective/Functional Measures: NT     Pain Level (0-10 scale) post treatment: 2    ASSESSMENT/Changes in Function:     Patient has been seen x 6 visits. She is progressing with increased tolerance for prolonged standing. She would benefit from continued treatment to progress strength and ROM to further decrease pain. Patient will continue to benefit from skilled PT services to modify and progress therapeutic interventions, address functional mobility deficits, address ROM deficits, address strength deficits, analyze and address soft tissue restrictions, and analyze and cue movement patterns to attain remaining goals. Progress towards goals / Updated goals:  Short Term Goals: To be accomplished in 2 weeks:               1. Patient will be I in HEP to promote self management of symptoms. PROGRESSING              2. Patient will report pain level at worst as less than or equal to 5/10 so they can perform ADLs without pain. NOT MET  Long Term Goals: To be accomplished in 4-6 weeks:               1.  Patient will report pain level at worst as less than or equal to 3/10 so they can perform ADLs without pain. NOT MET              2. Patient will be able to stand > or = 30 min without limitation from foot pain.   MET                3. Patient will be able to ambulate > or = 3000 feet without limitation from foot pain UNKNOWN     PLAN  [x]  Upgrade activities as tolerated     [x]  Continue plan of care 2x week x2 weeks from 11/1/22   []  Update interventions per flow sheet       []  Discharge due to:_  []  Other:_      Katy Medrano, PT, 11/8/2022

## 2022-11-10 ENCOUNTER — HOSPITAL ENCOUNTER (OUTPATIENT)
Dept: PHYSICAL THERAPY | Age: 42
Discharge: HOME | End: 2022-11-10
Payer: MEDICAID

## 2022-11-10 PROCEDURE — 97140 MANUAL THERAPY 1/> REGIONS: CPT | Performed by: PHYSICAL THERAPIST

## 2022-11-10 PROCEDURE — 97110 THERAPEUTIC EXERCISES: CPT | Performed by: PHYSICAL THERAPIST

## 2022-11-10 NOTE — PROGRESS NOTES
PT DAILY TREATMENT NOTE- [facility] 2-15    Patient Name: Ronaldo Xavier  Date:11/10/2022  : 1980  [x]  Patient  Verified  Payor: Jacobo Hall / Plan: [employer] / Product Type: Managed Care Medicaid /    In time: 12:00 PM  Out time: 1054 PM  Total Treatment Time (min): 38  Total Timed Codes (min):  38  Visit #:  9    Treatment Area: Pain in right foot [M79.551]    SUBJECTIVE  Pain Level (0-10 scale):  7-8  Any medication changes, allergies to medications, adverse drug reactions, diagnosis change, or new procedure performed?: [x] No    [] Yes (see summary sheet for update)  Subjective functional status/changes:   [] No changes reported  Patient reports that her L foot hurts today. OBJECTIVE  30 min Therapeutic Exercise:  [x] See flow sheet : progressed per flow sheet    Rationale: increase ROM and increase strength to improve the patients ability to perform ADLs      8 min Manual Therapy: Bilateral: first toe stretch, metatarsal glides, dorsiflexion stretch   Rationale: decrease pain, increase ROM, and increase tissue extensibility to improve the patients ability to perform ADLs             With   [x] TE   [] TA   [] neuro   [] other: Patient Education: [x] Review HEP    [] Progressed/Changed HEP based on:   [] positioning   [] body mechanics   [] transfers   [] heat/ice application    [] other: discussed recommendation for in home footwear with patient and patients daughter; advised that she have shoe with good support to wear in home as she spends prolonged periods standing; discussed purchasing a second pair of her current outdoor shoes and or going to either [employer] or [employer] so that she could try on shoes for fit/support      Other Objective/Functional Measures: NT     Pain Level (0-10 scale) post treatment: 2    ASSESSMENT/Changes in Function:     Patient tolerated treatment well today.          Patient will continue to benefit from skilled PT services to modify and progress therapeutic interventions, address functional mobility deficits, address ROM deficits, address strength deficits, analyze and address soft tissue restrictions, and analyze and cue movement patterns to attain remaining goals. Progress towards goals / Updated goals:  Short Term Goals: To be accomplished in 2 weeks:               1. Patient will be I in HEP to promote self management of symptoms. PROGRESSING              2. Patient will report pain level at worst as less than or equal to 5/10 so they can perform ADLs without pain. NOT MET  Long Term Goals: To be accomplished in 4-6 weeks:               1.  Patient will report pain level at worst as less than or equal to 3/10 so they can perform ADLs without pain. NOT MET              2. Patient will be able to stand > or = 30 min without limitation from foot pain.   MET                3. Patient will be able to ambulate > or = 3000 feet without limitation from foot pain UNKNOWN     PLAN  [x]  Upgrade activities as tolerated     [x]  Continue plan of care 2x week x2 weeks from 11/1/22   []  Update interventions per flow sheet       []  Discharge due to:_  []  Other:_      Lindsay Kowalski, PT, 11/10/2022

## 2022-11-14 ENCOUNTER — HOSPITAL ENCOUNTER (OUTPATIENT)
Dept: PHYSICAL THERAPY | Age: 42
Discharge: HOME | End: 2022-11-14
Payer: MEDICAID

## 2022-11-14 PROCEDURE — 97140 MANUAL THERAPY 1/> REGIONS: CPT | Performed by: PHYSICAL THERAPIST

## 2022-11-14 PROCEDURE — 97110 THERAPEUTIC EXERCISES: CPT | Performed by: PHYSICAL THERAPIST

## 2022-11-14 NOTE — PROGRESS NOTES
PT DAILY TREATMENT NOTE- [facility] -15    Patient Name: Judie Young  Date:2022  : 1980  [x]  Patient  Verified  Payor: Carlos Weiss / Plan: Karen Loaiza / Product Type: Managed Care Medicaid /    In time: 9276 AM  Out time: 1200 PM  Total Treatment Time (min): 15  Total Timed Codes (min):  15  Visit #:  10    Treatment Area: Pain in right foot [M79.671]    SUBJECTIVE  Pain Level (0-10 scale):  5-6  Any medication changes, allergies to medications, adverse drug reactions, diagnosis change, or new procedure performed?: [x] No    [] Yes (see summary sheet for update)  Subjective functional status/changes:   [] No changes reported  Patient reports that her HEP helps her pain. OBJECTIVE  0 min Therapeutic Exercise:  [x] See flow sheet : progressed per flow sheet    Rationale: increase ROM and increase strength to improve the patients ability to perform ADLs      15 min Manual Therapy: Bilateral: first toe stretch, metatarsal glides, dorsiflexion stretch   Rationale: decrease pain, increase ROM, and increase tissue extensibility to improve the patients ability to perform ADLs             With   [x] TE   [] TA   [] neuro   [] other: Patient Education: [x] Review HEP    [] Progressed/Changed HEP based on:   [] positioning   [] body mechanics   [] transfers   [] heat/ice application    [x] other: updated HEP      Other Objective/Functional Measures: NT     Pain Level (0-10 scale) post treatment: 2    ASSESSMENT/Changes in Function:     Held  exercises secondary to patient arriving late. Patient will continue to benefit from skilled PT services to modify and progress therapeutic interventions, address functional mobility deficits, address ROM deficits, address strength deficits, analyze and address soft tissue restrictions, and analyze and cue movement patterns to attain remaining goals. Progress towards goals / Updated goals:  Short Term Goals:  To be accomplished in 2 weeks: 1. Patient will be I in HEP to promote self management of symptoms. PROGRESSING              2. Patient will report pain level at worst as less than or equal to 5/10 so they can perform ADLs without pain. NOT MET  Long Term Goals: To be accomplished in 4-6 weeks:               1.  Patient will report pain level at worst as less than or equal to 3/10 so they can perform ADLs without pain. NOT MET              2. Patient will be able to stand > or = 30 min without limitation from foot pain.   MET                3. Patient will be able to ambulate > or = 3000 feet without limitation from foot pain UNKNOWN     PLAN  [x]  Upgrade activities as tolerated     [x]  Continue plan of care 2x week x2 weeks from 11/1/22   []  Update interventions per flow sheet       []  Discharge due to:_  []  Other:_      Seth Lesch, PT, 11/14/2022

## 2023-01-27 ENCOUNTER — OFFICE VISIT (OUTPATIENT)
Dept: FAMILY MEDICINE CLINIC | Age: 43
End: 2023-01-27
Payer: MEDICAID

## 2023-01-27 VITALS
TEMPERATURE: 98.2 F | RESPIRATION RATE: 16 BRPM | DIASTOLIC BLOOD PRESSURE: 74 MMHG | OXYGEN SATURATION: 98 % | WEIGHT: 280 LBS | HEART RATE: 73 BPM | SYSTOLIC BLOOD PRESSURE: 130 MMHG | BODY MASS INDEX: 47.8 KG/M2 | HEIGHT: 64 IN

## 2023-01-27 DIAGNOSIS — E66.01 MORBID OBESITY (HCC): ICD-10-CM

## 2023-01-27 DIAGNOSIS — E03.9 ACQUIRED HYPOTHYROIDISM: Primary | ICD-10-CM

## 2023-01-27 PROCEDURE — 99214 OFFICE O/P EST MOD 30 MIN: CPT | Performed by: NURSE PRACTITIONER

## 2023-01-27 RX ORDER — PROPRANOLOL HYDROCHLORIDE 60 MG/1
60 CAPSULE, EXTENDED RELEASE ORAL DAILY
COMMUNITY
Start: 2023-01-10

## 2023-01-27 NOTE — PROGRESS NOTES
Chief Complaint   Patient presents with    Follow-up       1. \"Have you been to the ER, urgent care clinic since your last visit? Hospitalized since your last visit? \" No    2. \"Have you seen or consulted any other health care providers outside of the [address] [place] [address] since your last visit? \" No     3. For patients over 45: Has the patient had a colonoscopy? Yes - no Care Gap present     If the patient is female:    4. For patients over 40: Has the patient had a mammogram? Yes - no Care Gap present    5. For patients over 21: Has the patient had a pap smear?  Yes - no Care Gap present    3 most recent PHQ Screens 1/27/2023   Little interest or pleasure in doing things Not at all   Feeling down, depressed, irritable, or hopeless Several days   Total Score PHQ 2 1     Health Maintenance Due   Topic Date Due    COVID-19 Vaccine (1) Never done    Flu Vaccine (1) 08/01/2022

## 2023-01-27 NOTE — PROGRESS NOTES
Assessment/Plan:     {There are no diagnoses linked to this encounter. (Refresh or delete this SmartLink)}         Discussed expected course/resolution/complications of diagnosis in detail with patient. Medication risks/benefits/costs/interactions/alternatives discussed with patient. Pt was given after visit summary which includes diagnoses, current medications & vitals. Pt expressed understanding with the diagnosis and plan          Subjective:      Brandyn Mott is a 38 y.o. female who presents for had concerns including Follow-up. ***    Patient Active Problem List   Diagnosis Code    Dizziness R42    HA (headache) R51.9    Depression F32. A    Obesity, morbid (HCC) E66.01    DOUGLAS (generalized anxiety disorder) F41.1    Acquired hypothyroidism E03.9    H. pylori infection A04.8    Throat pain R07.0    Acute otitis externa of left ear H60.502    Shortness of breath R06.02    Left leg pain M79.605    Pelvic pain R10.2    Hyperlipidemia LDL goal <100 E78.5    ZACH (obstructive sleep apnea) G47.33       Current Outpatient Medications   Medication Sig Dispense Refill    propranolol LA (INDERAL LA) 60 mg SR capsule Take 60 mg by mouth daily. levothyroxine (SYNTHROID) 100 mcg tablet Take 1 Tablet by mouth daily. 30 Tablet 3    polyethylene glycol (MIRALAX) 17 gram packet Take 1 Packet by mouth daily. 30 Packet 0       No Known Allergies    ROS:   ROS    Objective:   Visit Vitals  /74 (BP 1 Location: Left lower arm, BP Patient Position: Sitting, BP Cuff Size: Large adult long)   Pulse 73   Temp 98.2 °F (36.8 °C)   Resp 16   Ht 5' 4\" (1.626 m)   Wt 280 lb (127 kg)   LMP 01/19/2023 (Approximate)   SpO2 98%   BMI 48.06 kg/m²       Vitals and Nurse Documentation reviewed.      Physical Exam relationship with her children because she will get upset when they don't eat all the food she gives them. Patient Active Problem List   Diagnosis Code    Depression F32. A    Obesity, morbid (HCC) E66.01    DOUGLAS (generalized anxiety disorder) F41.1    Acquired hypothyroidism E03.9    H. pylori infection A04.8    Shortness of breath R06.02    Pelvic pain R10.2    Hyperlipidemia LDL goal <100 E78.5    ZACH (obstructive sleep apnea) G47.33       Current Outpatient Medications   Medication Sig Dispense Refill    propranolol LA (INDERAL LA) 60 mg SR capsule Take 60 mg by mouth daily. levothyroxine (SYNTHROID) 100 mcg tablet Take 1 Tablet by mouth daily. 30 Tablet 3    polyethylene glycol (MIRALAX) 17 gram packet Take 1 Packet by mouth daily. 30 Packet 0       No Known Allergies    ROS:   Review of Systems   Constitutional:  Negative for malaise/fatigue. Eyes:  Negative for blurred vision. Respiratory:  Negative for shortness of breath. Cardiovascular:  Negative for chest pain. Objective:   Visit Vitals  /74 (BP 1 Location: Left lower arm, BP Patient Position: Sitting, BP Cuff Size: Large adult long)   Pulse 73   Temp 98.2 °F (36.8 °C)   Resp 16   Ht 5' 4\" (1.626 m)   Wt 280 lb (127 kg)   LMP 01/19/2023 (Approximate)   SpO2 98%   BMI 48.06 kg/m²       Vitals and Nurse Documentation reviewed. Physical Exam  Constitutional:       General: She is not in acute distress. Appearance: She is obese. Cardiovascular:      Heart sounds: S1 normal and S2 normal. No murmur heard. No friction rub. No gallop. Pulmonary:      Effort: No respiratory distress. Breath sounds: Normal breath sounds. Skin:     General: Skin is warm and dry. Psychiatric:         Mood and Affect: Mood normal. Affect is tearful.

## 2023-01-28 LAB — TSH SERPL DL<=0.05 MIU/L-ACNC: 2.16 UIU/ML (ref 0.36–3.74)

## 2023-02-02 PROBLEM — H60.502 ACUTE OTITIS EXTERNA OF LEFT EAR: Status: RESOLVED | Noted: 2020-06-30 | Resolved: 2023-02-02

## 2023-02-02 PROBLEM — M79.605 LEFT LEG PAIN: Status: RESOLVED | Noted: 2020-06-30 | Resolved: 2023-02-02

## 2023-02-02 PROBLEM — R07.0 THROAT PAIN: Status: RESOLVED | Noted: 2020-06-30 | Resolved: 2023-02-02

## 2023-02-13 DIAGNOSIS — E03.9 ACQUIRED HYPOTHYROIDISM: ICD-10-CM

## 2023-02-15 RX ORDER — LEVOTHYROXINE SODIUM 100 UG/1
TABLET ORAL
Qty: 30 TABLET | Refills: 3 | Status: SHIPPED | OUTPATIENT
Start: 2023-02-15

## 2023-03-17 ENCOUNTER — TELEPHONE (OUTPATIENT)
Dept: FAMILY MEDICINE CLINIC | Age: 43
End: 2023-03-17

## 2023-03-17 NOTE — TELEPHONE ENCOUNTER
Shanthi Bernardo (EC) called said the pt has a colonoscopy appt on 3/27/23. They told her the pt needs to start a particular diet on 3/20/23. Caller requested callback to discuss what the pt can eat. Also needs a note from provider saying that she is ware that pt will be getting a colonoscopy.      BCB# [phone]

## 2023-03-20 NOTE — TELEPHONE ENCOUNTER
Returned a call to the patient to inform her that she needs to contact the GI office for instructions on upcoming colonoscopy. The patient was not available to come to the phone.

## 2023-04-25 DIAGNOSIS — K59.00 CONSTIPATION, UNSPECIFIED CONSTIPATION TYPE: ICD-10-CM

## 2023-04-26 RX ORDER — FLUTICASONE PROPIONATE 50 MCG
SPRAY, SUSPENSION (ML) NASAL
Qty: 30 PACKET | Refills: 0 | Status: SHIPPED | OUTPATIENT
Start: 2023-04-26

## 2023-05-23 DIAGNOSIS — K59.00 CONSTIPATION, UNSPECIFIED: ICD-10-CM

## 2023-05-23 RX ORDER — FLUTICASONE PROPIONATE 50 MCG
SPRAY, SUSPENSION (ML) NASAL
Qty: 72 EACH | Refills: 2 | Status: SHIPPED | OUTPATIENT
Start: 2023-05-23

## 2023-05-23 RX ORDER — FLUTICASONE PROPIONATE 50 MCG
17 SPRAY, SUSPENSION (ML) NASAL DAILY
COMMUNITY
Start: 2023-04-26

## 2023-05-23 RX ORDER — AMOXICILLIN AND CLAVULANATE POTASSIUM 875; 125 MG/1; MG/1
TABLET, FILM COATED ORAL
COMMUNITY
Start: 2023-05-16

## 2023-05-23 RX ORDER — ACETAMINOPHEN 500 MG/1
TABLET ORAL
COMMUNITY
Start: 2023-05-16

## 2023-05-23 NOTE — TELEPHONE ENCOUNTER
PCP: MOY Wu NP          Requested Prescriptions     Pending Prescriptions Disp Refills    [facility] PURELAX 17 g packet [Pharmacy Med Name: [facility] PURELAX POWDER PACKET]       Sig: TAKE 1 PACKET BY MOUTH DAILY

## 2023-06-27 DIAGNOSIS — E03.9 HYPOTHYROIDISM, UNSPECIFIED: ICD-10-CM

## 2023-06-27 RX ORDER — LEVOTHYROXINE SODIUM 0.1 MG/1
TABLET ORAL
Qty: 30 TABLET | Refills: 3 | Status: SHIPPED | OUTPATIENT
Start: 2023-06-27

## 2023-11-12 DIAGNOSIS — E03.9 HYPOTHYROIDISM, UNSPECIFIED: ICD-10-CM

## 2023-11-13 RX ORDER — LEVOTHYROXINE SODIUM 0.1 MG/1
TABLET ORAL
Qty: 30 TABLET | Refills: 3 | Status: SHIPPED | OUTPATIENT
Start: 2023-11-13

## 2023-11-29 DIAGNOSIS — K59.00 CONSTIPATION, UNSPECIFIED: ICD-10-CM

## 2023-11-29 NOTE — TELEPHONE ENCOUNTER
Request for refills of propranolol and Purelax. Patient not sure if you provide the propranolol or specialist per message below. Route back to me if needed. **See messages in bold below. ** Thanks, Monica    Last appointment: 1/27/23 Raffaele  Next appointment: 12/26/23 [place]  Previous refill encounter(s):   [facility] 5/23/23 72 + 2  Propranolol - historical provider    Requested Prescriptions     Pending Prescriptions Disp Refills    polyethylene glycol ([facility] PURELAX) 17 g packet 30 each 0     Sig: Take 1 packet by mouth daily    propranolol (INDERAL LA) 60 MG extended release capsule 30 capsule 0     Sig: Take 1 capsule by mouth daily     For Pharmacy Admin Tracking Only    Program: Medication Refill  CPA in place:    Recommendation Provided To: Intervention Detail: New Rx: 2, reason: Patient Preference  Intervention Accepted By:   Mary De Dios Closed?:    Time Spent (min): 5      Subject: Refill Request     QUESTIONS   Name of Medication? propranolol (INDERAL LA) 60 MG extended release   capsule   Patient-reported dosage and instructions? daughter was calling for the   patient and is unsure   How many days do you have left? 4   [id number]   Pharmacy phone number (if available)? [phone]   Additional Information for Provider? Bridgett's daughter was calling to   get refill on this medication. Patient was Rx'd this from a specialist at   the [facility] [address] Will you be able to refill? Or should they   contact the specialist directly for refill? Please let them know.   ---------------------------------------------------------------------------   --------------,   Name of Medication? [facility] PURE[facility] 17 g packet   Patient-reported dosage and instructions? one or two packets daily   How many days do you have left? 4   Preferred Pharmacy? [id number]   Pharmacy phone number (if available)? [phone]   Additional Information for Provider?  Patient's last OV was 1/27/23

## 2023-11-30 ENCOUNTER — TELEPHONE (OUTPATIENT)
Age: 43
End: 2023-11-30

## 2023-11-30 NOTE — TELEPHONE ENCOUNTER
----- Message from Cornel Mcdowell sent at 11/29/2023  9:56 AM EST -----  Subject: Message to Provider    QUESTIONS  Information for Provider? Patient is scheduled to be seen on 12/26/23 for   ongoing but intermittent SOB and dizziness. Patient will require    services for that appt; language is CHRIS.   ---------------------------------------------------------------------------  --------------  [address]  [phone]; OK to leave message on voicemail  ---------------------------------------------------------------------------  --------------  SCRIPT ANSWERS  Relationship to Patient? Other/Third Party  Representative Name? Daughter/ Maria Luisa Reyes  Is the representative on the Communication Release of Information (KELLEN)   form in Epic?  Yes

## 2023-12-01 RX ORDER — POLYETHYLENE GLYCOL 3350 17 G/17G
17 POWDER, FOR SOLUTION ORAL DAILY
Qty: 30 EACH | Refills: 0 | Status: SHIPPED | OUTPATIENT
Start: 2023-12-01

## 2023-12-01 RX ORDER — PROPRANOLOL HCL 60 MG
60 CAPSULE, EXTENDED RELEASE 24HR ORAL DAILY
Qty: 30 CAPSULE | Refills: 0 | Status: SHIPPED | OUTPATIENT
Start: 2023-12-01

## 2024-01-31 ENCOUNTER — OFFICE VISIT (OUTPATIENT)
Age: 44
End: 2024-01-31
Payer: MEDICAID

## 2024-01-31 VITALS
BODY MASS INDEX: 47.05 KG/M2 | TEMPERATURE: 97.7 F | HEIGHT: 64 IN | DIASTOLIC BLOOD PRESSURE: 63 MMHG | HEART RATE: 60 BPM | OXYGEN SATURATION: 97 % | RESPIRATION RATE: 14 BRPM | WEIGHT: 275.6 LBS | SYSTOLIC BLOOD PRESSURE: 105 MMHG

## 2024-01-31 DIAGNOSIS — B35.1 ONYCHOMYCOSIS: Primary | ICD-10-CM

## 2024-01-31 DIAGNOSIS — R06.02 SHORTNESS OF BREATH: ICD-10-CM

## 2024-01-31 DIAGNOSIS — K62.5 RECTAL BLEEDING: ICD-10-CM

## 2024-01-31 PROCEDURE — 99214 OFFICE O/P EST MOD 30 MIN: CPT | Performed by: NURSE PRACTITIONER

## 2024-01-31 RX ORDER — METFORMIN HYDROCHLORIDE 500 MG/1
500 TABLET, EXTENDED RELEASE ORAL
COMMUNITY
Start: 2023-12-06

## 2024-01-31 RX ORDER — ERGOCALCIFEROL 1.25 MG/1
CAPSULE ORAL
COMMUNITY
Start: 2024-01-11

## 2024-01-31 SDOH — ECONOMIC STABILITY: INCOME INSECURITY: HOW HARD IS IT FOR YOU TO PAY FOR THE VERY BASICS LIKE FOOD, HOUSING, MEDICAL CARE, AND HEATING?: NOT VERY HARD

## 2024-01-31 SDOH — ECONOMIC STABILITY: HOUSING INSECURITY
IN THE LAST 12 MONTHS, WAS THERE A TIME WHEN YOU DID NOT HAVE A STEADY PLACE TO SLEEP OR SLEPT IN A SHELTER (INCLUDING NOW)?: NO

## 2024-01-31 SDOH — ECONOMIC STABILITY: FOOD INSECURITY: WITHIN THE PAST 12 MONTHS, THE FOOD YOU BOUGHT JUST DIDN'T LAST AND YOU DIDN'T HAVE MONEY TO GET MORE.: NEVER TRUE

## 2024-01-31 SDOH — ECONOMIC STABILITY: FOOD INSECURITY: WITHIN THE PAST 12 MONTHS, YOU WORRIED THAT YOUR FOOD WOULD RUN OUT BEFORE YOU GOT MONEY TO BUY MORE.: NEVER TRUE

## 2024-01-31 ASSESSMENT — PATIENT HEALTH QUESTIONNAIRE - PHQ9
10. IF YOU CHECKED OFF ANY PROBLEMS, HOW DIFFICULT HAVE THESE PROBLEMS MADE IT FOR YOU TO DO YOUR WORK, TAKE CARE OF THINGS AT HOME, OR GET ALONG WITH OTHER PEOPLE: 0
7. TROUBLE CONCENTRATING ON THINGS, SUCH AS READING THE NEWSPAPER OR WATCHING TELEVISION: 0
SUM OF ALL RESPONSES TO PHQ QUESTIONS 1-9: 0
SUM OF ALL RESPONSES TO PHQ QUESTIONS 1-9: 0
6. FEELING BAD ABOUT YOURSELF - OR THAT YOU ARE A FAILURE OR HAVE LET YOURSELF OR YOUR FAMILY DOWN: 0
8. MOVING OR SPEAKING SO SLOWLY THAT OTHER PEOPLE COULD HAVE NOTICED. OR THE OPPOSITE, BEING SO FIGETY OR RESTLESS THAT YOU HAVE BEEN MOVING AROUND A LOT MORE THAN USUAL: 0
SUM OF ALL RESPONSES TO PHQ9 QUESTIONS 1 & 2: 0
4. FEELING TIRED OR HAVING LITTLE ENERGY: 0
5. POOR APPETITE OR OVEREATING: 0
SUM OF ALL RESPONSES TO PHQ QUESTIONS 1-9: 0
1. LITTLE INTEREST OR PLEASURE IN DOING THINGS: 0
2. FEELING DOWN, DEPRESSED OR HOPELESS: 0
9. THOUGHTS THAT YOU WOULD BE BETTER OFF DEAD, OR OF HURTING YOURSELF: 0
SUM OF ALL RESPONSES TO PHQ QUESTIONS 1-9: 0

## 2024-01-31 NOTE — PATIENT INSTRUCTIONS
Pulmonologist Address:  [address] [place] [address], [address]  [facility] [address]  Phone: [phone]    Dr. Nikita Renner  Address: [facility] [address], [place] [address]  Hours: Open ? Closes 4:30?PM  Confirmed by this business 6 weeks ago  Phone: [phone]

## 2024-01-31 NOTE — PROGRESS NOTES
Chief Complaint   Patient presents with    Referral - General     For dietician, liver and lung specialist.     Toe Pain     Left leg    Rectal Bleeding     Need neosporin prescription.     \"Have you been to the ER, urgent care clinic since your last visit?  Hospitalized since your last visit?\"    NO    “Have you seen or consulted any other health care providers outside of [facility] since your last visit?”    NO        “Have you had a pap smear?”    NO             Financial Resource Strain: Low Risk  (1/31/2024)    Overall Financial Resource Strain (CARDIA)     Difficulty of Paying Living Expenses: Not very hard      Food Insecurity: No Food Insecurity (1/31/2024)    Hunger Vital Sign     Worried About Running Out of Food in the Last Year: Never true     Ran Out of Food in the Last Year: Never true            1/31/2024     1:33 PM   PHQ-9    Little interest or pleasure in doing things 0   Feeling down, depressed, or hopeless 0   Trouble falling or staying asleep, or sleeping too much 0   Feeling tired or having little energy 0   Poor appetite or overeating 0   Feeling bad about yourself - or that you are a failure or have let yourself or your family down 0   Trouble concentrating on things, such as reading the newspaper or watching television 0   Moving or speaking so slowly that other people could have noticed. Or the opposite - being so fidgety or restless that you have been moving around a lot more than usual 0   Thoughts that you would be better off dead, or of hurting yourself in some way 0   PHQ-2 Score 0   PHQ-9 Total Score 0   If you checked off any problems, how difficult have these problems made it for you to do your work, take care of things at home, or get along with other people? 0       Health Maintenance Due   Topic Date Due    Hepatitis B vaccine (1 of 3 - 3-dose series) Never done    COVID-19 Vaccine (1) Never done    HIV screen  Never done    Cervical cancer screen  06/06/2023    
heard.     No friction rub. No gallop.   Pulmonary:      Effort: Pulmonary effort is normal.      Breath sounds: Normal breath sounds.   Feet:      Right foot:      Toenail Condition: Fungal disease present.     Left foot:      Toenail Condition: Left toenails are abnormally thick. Fungal disease present.  Neurological:      Mental Status: She is alert.   Psychiatric:         Behavior: Behavior normal.         Thought Content: Thought content normal.

## 2024-02-01 ENCOUNTER — TELEPHONE (OUTPATIENT)
Age: 44
End: 2024-02-01

## 2024-02-01 ASSESSMENT — ENCOUNTER SYMPTOMS: SHORTNESS OF BREATH: 1

## 2024-02-01 NOTE — TELEPHONE ENCOUNTER
Pt called says referral placed by PCP for podiatrist they don't accept her health insurance and would like to know if a referral can be sent elsewhere that does. Best call back number [phone]

## 2024-02-02 NOTE — TELEPHONE ENCOUNTER
Two patient identifiers confirmed:  Spoke with the patients son and requested him to reach out to the patients insurance to see what podiatrist is in network. Once that is done to let us know so we can send the referral.

## 2024-02-23 DIAGNOSIS — K59.00 CONSTIPATION, UNSPECIFIED: ICD-10-CM

## 2024-02-26 NOTE — TELEPHONE ENCOUNTER
PCP: Alaina Castorena APRN - NP    Last appt: 1/31/2024   Future Appointments   Date Time Provider Department Center   8/1/2024  1:15 PM Alaina Castorena APRN - NP [facility]       Requested Prescriptions     Pending Prescriptions Disp Refills    [facility] PURELAX 17 g packet [Pharmacy Med Name: [facility] PURELAX POWDER PACKET] 30 packet      Sig: TAKE 1 PACKET DAILY BY MOUTH         Prior labs and Blood pressures:  BP Readings from Last 3 Encounters:   01/31/24 105/63   01/27/23 130/74   10/10/22 128/82     Lab Results   Component Value Date/Time     07/08/2022 11:56 AM    K 4.4 07/08/2022 11:56 AM     07/08/2022 11:56 AM    CO2 28 07/08/2022 11:56 AM    BUN 7 07/08/2022 11:56 AM    GFRAA >60 07/08/2022 11:56 AM     No results found for: \"HBA1C\", \"[id number]\"  Lab Results   Component Value Date/Time    CHOL 172 07/08/2022 11:56 AM    HDL 35 07/08/2022 11:56 AM     No results found for: \"VITD3\", \"VD3RIA\"    Lab Results   Component Value Date/Time    TSH 2.16 01/27/2023 12:03 PM

## 2024-02-27 RX ORDER — FLUTICASONE PROPIONATE 50 MCG
SPRAY, SUSPENSION (ML) NASAL
Qty: 30 PACKET | Refills: 1 | Status: SHIPPED | OUTPATIENT
Start: 2024-02-27

## 2024-03-11 DIAGNOSIS — E03.9 HYPOTHYROIDISM, UNSPECIFIED: ICD-10-CM

## 2024-03-11 NOTE — TELEPHONE ENCOUNTER
PCP: Alaina Castorena APRN - NP    Last appt: 1/31/2024   Future Appointments   Date Time Provider Department Center   8/1/2024  1:15 PM Alaina Castorena APRN - NP [facility]       Requested Prescriptions     Pending Prescriptions Disp Refills    levothyroxine (SYNTHROID) 100 MCG tablet [Pharmacy Med Name: LEVOTHYROXINE 100 MCG TABLET] 30 tablet 3     Sig: TAKE 1 TABLET BY MOUTH EVERY DAY         Prior labs and Blood pressures:  BP Readings from Last 3 Encounters:   01/31/24 105/63   01/27/23 130/74   10/10/22 128/82     Lab Results   Component Value Date/Time     07/08/2022 11:56 AM    K 4.4 07/08/2022 11:56 AM     07/08/2022 11:56 AM    CO2 28 07/08/2022 11:56 AM    BUN 7 07/08/2022 11:56 AM    GFRAA >60 07/08/2022 11:56 AM     No results found for: \"HBA1C\", \"[id number]\"  Lab Results   Component Value Date/Time    CHOL 172 07/08/2022 11:56 AM    HDL 35 07/08/2022 11:56 AM     No results found for: \"VITD3\", \"VD3RIA\"    Lab Results   Component Value Date/Time    TSH 2.16 01/27/2023 12:03 PM

## 2024-03-12 RX ORDER — LEVOTHYROXINE SODIUM 0.1 MG/1
TABLET ORAL
Qty: 30 TABLET | Refills: 3 | Status: SHIPPED | OUTPATIENT
Start: 2024-03-12

## 2024-05-01 DIAGNOSIS — E03.9 HYPOTHYROIDISM, UNSPECIFIED: ICD-10-CM

## 2024-05-02 NOTE — TELEPHONE ENCOUNTER
PCP: Alaina Castorena APRN - NP    Last appt: 1/31/2024   Future Appointments   Date Time Provider Department Center   8/1/2024  1:15 PM Alaina Castorena APRN - NP [facility]       Requested Prescriptions     Pending Prescriptions Disp Refills    levothyroxine (SYNTHROID) 100 MCG tablet [Pharmacy Med Name: LEVOTHYROXINE 100 MCG TABLET] 90 tablet 1     Sig: TAKE 1 TABLET BY MOUTH EVERY DAY         Prior labs and Blood pressures:  BP Readings from Last 3 Encounters:   01/31/24 105/63   01/27/23 130/74   10/10/22 128/82     Lab Results   Component Value Date/Time     07/08/2022 11:56 AM    K 4.4 07/08/2022 11:56 AM     07/08/2022 11:56 AM    CO2 28 07/08/2022 11:56 AM    BUN 7 07/08/2022 11:56 AM    GFRAA >60 07/08/2022 11:56 AM     No results found for: \"HBA1C\", \"[id number]\"  Lab Results   Component Value Date/Time    CHOL 172 07/08/2022 11:56 AM    HDL 35 07/08/2022 11:56 AM    LDL 73.4 07/08/2022 11:56 AM    VLDL 63.6 07/08/2022 11:56 AM     No results found for: \"VITD3\", \"VD3RIA\"    Lab Results   Component Value Date/Time    TSH 2.16 01/27/2023 12:03 PM

## 2024-05-04 RX ORDER — LEVOTHYROXINE SODIUM 0.1 MG/1
TABLET ORAL
Qty: 90 TABLET | Refills: 1 | Status: SHIPPED | OUTPATIENT
Start: 2024-05-04

## 2024-09-16 ENCOUNTER — OFFICE VISIT (OUTPATIENT)
Age: 44
End: 2024-09-16
Payer: MEDICAID

## 2024-09-16 VITALS
SYSTOLIC BLOOD PRESSURE: 130 MMHG | BODY MASS INDEX: 47.29 KG/M2 | OXYGEN SATURATION: 98 % | RESPIRATION RATE: 16 BRPM | DIASTOLIC BLOOD PRESSURE: 77 MMHG | HEIGHT: 64 IN | TEMPERATURE: 98.2 F | HEART RATE: 70 BPM | WEIGHT: 277 LBS

## 2024-09-16 DIAGNOSIS — R73.03 PREDIABETES: Primary | ICD-10-CM

## 2024-09-16 DIAGNOSIS — E03.9 ACQUIRED HYPOTHYROIDISM: ICD-10-CM

## 2024-09-16 DIAGNOSIS — H69.93 EUSTACHIAN TUBE DYSFUNCTION, BILATERAL: ICD-10-CM

## 2024-09-16 DIAGNOSIS — Z13.220 SCREENING, LIPID: ICD-10-CM

## 2024-09-16 LAB — HBA1C MFR BLD: 5.6 %

## 2024-09-16 PROCEDURE — 99214 OFFICE O/P EST MOD 30 MIN: CPT | Performed by: NURSE PRACTITIONER

## 2024-09-16 PROCEDURE — 83036 HEMOGLOBIN GLYCOSYLATED A1C: CPT | Performed by: NURSE PRACTITIONER

## 2024-09-16 RX ORDER — HYDROCORTISONE 2.5 %
CREAM (GRAM) TOPICAL
COMMUNITY
Start: 2024-08-11

## 2024-09-16 RX ORDER — BUPROPION HYDROCHLORIDE 150 MG/1
150 TABLET ORAL DAILY
COMMUNITY
Start: 2024-09-11

## 2024-09-16 RX ORDER — FLUTICASONE PROPIONATE 50 MCG
2 SPRAY, SUSPENSION (ML) NASAL DAILY
Qty: 16 G | Refills: 0 | Status: SHIPPED | OUTPATIENT
Start: 2024-09-16

## 2024-09-16 ASSESSMENT — ENCOUNTER SYMPTOMS: SHORTNESS OF BREATH: 0

## 2024-09-18 LAB
ALBUMIN SERPL-MCNC: 3.9 G/DL (ref 3.5–5)
ALBUMIN/GLOB SERPL: 1.3 (ref 1.1–2.2)
ALP SERPL-CCNC: 93 U/L (ref 45–117)
ALT SERPL-CCNC: 28 U/L (ref 12–78)
ANION GAP SERPL CALC-SCNC: 4 MMOL/L (ref 2–12)
AST SERPL-CCNC: 7 U/L (ref 15–37)
BILIRUB SERPL-MCNC: 0.7 MG/DL (ref 0.2–1)
BUN SERPL-MCNC: 9 MG/DL (ref 6–20)
BUN/CREAT SERPL: 17 (ref 12–20)
CALCIUM SERPL-MCNC: 9.6 MG/DL (ref 8.5–10.1)
CHLORIDE SERPL-SCNC: 106 MMOL/L (ref 97–108)
CHOLEST SERPL-MCNC: 148 MG/DL
CO2 SERPL-SCNC: 28 MMOL/L (ref 21–32)
CREAT SERPL-MCNC: 0.52 MG/DL (ref 0.55–1.02)
GLOBULIN SER CALC-MCNC: 3 G/DL (ref 2–4)
GLUCOSE SERPL-MCNC: 91 MG/DL (ref 65–100)
HDLC SERPL-MCNC: 39 MG/DL
HDLC SERPL: 3.8 (ref 0–5)
LDLC SERPL CALC-MCNC: 75.8 MG/DL (ref 0–100)
POTASSIUM SERPL-SCNC: 4.5 MMOL/L (ref 3.5–5.1)
PROT SERPL-MCNC: 6.9 G/DL (ref 6.4–8.2)
SODIUM SERPL-SCNC: 138 MMOL/L (ref 136–145)
TRIGL SERPL-MCNC: 166 MG/DL
TSH SERPL DL<=0.05 MIU/L-ACNC: 0.94 UIU/ML (ref 0.36–3.74)
VLDLC SERPL CALC-MCNC: 33.2 MG/DL

## 2024-10-31 DIAGNOSIS — H69.93 EUSTACHIAN TUBE DYSFUNCTION, BILATERAL: ICD-10-CM

## 2024-10-31 NOTE — TELEPHONE ENCOUNTER
PCP: Alaina Castorena APRN - NP    Last Appt:9/16/2024    Future Appointments   Date Time Provider Department Center   3/21/2025 11:00 AM Alaina Castorena APRN - NP [facility]       Requested Prescriptions     Pending Prescriptions Disp Refills    fluticasone (FLONASE) 50 MCG/ACT nasal spray [Pharmacy Med Name: FLUTICASONE PROP 50 MCG SPRAY]       Sig: SPRAY 2 SPRAYS INTO EACH NOSTRIL EVERY DAY       Prior labs and Blood pressures:  BP Readings from Last 3 Encounters:   09/16/24 130/77   01/31/24 105/63   01/27/23 130/74     Lab Results   Component Value Date/Time     09/16/2024 11:59 AM    K 4.5 09/16/2024 11:59 AM     09/16/2024 11:59 AM    CO2 28 09/16/2024 11:59 AM    BUN 9 09/16/2024 11:59 AM    GFRAA >60 07/08/2022 11:56 AM     Lab Results   Component Value Date/Time    GMZ8EEGQ 5.6 09/16/2024 11:27 AM     Lab Results   Component Value Date/Time    CHOL 148 09/16/2024 11:59 AM    HDL 39 09/16/2024 11:59 AM    LDL 75.8 09/16/2024 11:59 AM    LDL 73.4 07/08/2022 11:56 AM    VLDL 33.2 09/16/2024 11:59 AM     No results found for: \"VITD3\"    Lab Results   Component Value Date/Time    TSH 0.94 09/16/2024 11:59 AM

## 2024-11-01 RX ORDER — FLUTICASONE PROPIONATE 50 MCG
2 SPRAY, SUSPENSION (ML) NASAL DAILY
Qty: 3 EACH | Refills: 3 | Status: SHIPPED | OUTPATIENT
Start: 2024-11-01

## 2024-12-08 DIAGNOSIS — E03.9 HYPOTHYROIDISM, UNSPECIFIED: ICD-10-CM

## 2024-12-09 RX ORDER — LEVOTHYROXINE SODIUM 100 UG/1
TABLET ORAL
Qty: 90 TABLET | Refills: 1 | Status: SHIPPED | OUTPATIENT
Start: 2024-12-09

## 2025-03-10 ENCOUNTER — TELEPHONE (OUTPATIENT)
Age: 45
End: 2025-03-10

## 2025-03-10 NOTE — TELEPHONE ENCOUNTER
Receiving message from [facility] stating patient needs refill of ALL medications.    Noted several meds are noted as not taking, and some have refills- Tried contacting family member to check- LVM for return call.  ThanksMonica    Spoke with [facility], metformin comes from another provider- advised contacting that provider.    (Levothyroxine and flonase - only meds from this provider- Raffaele).    ThanksMonica      For Pharmacy Admin Tracking Only    Program: Medication Refill  CPA in place:    Recommendation Provided To:   Intervention Detail: Discontinued Rx: 2, reason: Duplicate Therapy  Intervention Accepted By:   Gap Closed?:    Time Spent (min): 10

## 2025-05-07 DIAGNOSIS — E03.9 HYPOTHYROIDISM, UNSPECIFIED: ICD-10-CM

## 2025-05-08 NOTE — TELEPHONE ENCOUNTER
PCP: Alaina Castorena APRN - NP    Last appt: 9/16/2024   No future appointments.    Requested Prescriptions     Pending Prescriptions Disp Refills    levothyroxine (SYNTHROID) 100 MCG tablet [Pharmacy Med Name: LEVOTHYROXINE 100 MCG TABLET] 90 tablet 1     Sig: TAKE 1 TABLET BY MOUTH EVERY DAY

## 2025-07-30 DIAGNOSIS — E03.9 HYPOTHYROIDISM, UNSPECIFIED: ICD-10-CM

## 2025-07-30 RX ORDER — LEVOTHYROXINE SODIUM 100 UG/1
100 TABLET ORAL DAILY
Qty: 90 TABLET | Refills: 1 | OUTPATIENT
Start: 2025-07-30

## 2025-07-30 RX ORDER — LEVOTHYROXINE SODIUM 100 UG/1
100 TABLET ORAL DAILY
Qty: 90 TABLET | Refills: 1 | Status: SHIPPED | OUTPATIENT
Start: 2025-07-30

## 2025-07-30 NOTE — TELEPHONE ENCOUNTER
Patient call for refill of levothyroxine.  Out of medication - x 1 week.  (Request noted 5/7/25 but was still pending).    Appointment due.  Monica Bernstein    Last appointment: 9/16/24 Raffaele, linda 9/2024   Next appointment: None- (provider cancel 3/21/25, no show 4/30/25 and 5/23/25)  Previous refill encounter(s): 12/9/24 90 + 1    Requested Prescriptions     Pending Prescriptions Disp Refills    levothyroxine (SYNTHROID) 100 MCG tablet 90 tablet 1     Sig: Take 1 tablet by mouth daily     For Pharmacy Admin Tracking Only    Program: Medication Refill  CPA in place:    Recommendation Provided To:   Intervention Detail: New Rx: 1, reason: Patient Preference  Intervention Accepted By:   Gap Closed?:    Time Spent (min): 5

## 2025-07-30 NOTE — TELEPHONE ENCOUNTER
Tried to contact pt no answer and phone wouldn't go to . If pt calls back per NP Alaina Castorena pt is overdue for routine follow up appt.-TM 7/30/25.

## (undated) DEVICE — CATH IV AUTOGRD BC BLU 22GA 25 -- INSYTE

## (undated) DEVICE — KENDALL RADIOLUCENT FOAM MONITORING ELECTRODE -RECTANGULAR SHAPE: Brand: KENDALL

## (undated) DEVICE — Z DISCONTINUED NO SUB IDED SET EXTN W/ 4 W STPCOCK M SPIN LOK 36IN

## (undated) DEVICE — CANN NASAL O2 CAPNOGRAPHY AD -- FILTERLINE

## (undated) DEVICE — BAG SPEC BIOHZD LF 2MIL 6X10IN -- CONVERT TO ITEM 357326

## (undated) DEVICE — SOLIDIFIER FLUID 3000 CC ABSORB

## (undated) DEVICE — BW-412T DISP COMBO CLEANING BRUSH: Brand: SINGLE USE COMBINATION CLEANING BRUSH

## (undated) DEVICE — ENDO CARRY-ON PROCEDURE KIT INCLUDES ENZYMATIC SPONGE, GAUZE, BIOHAZARD LABEL, TRAY, LUBRICANT, DIRTY SCOPE LABEL, WATER LABEL, TRAY, DRAWSTRING PAD, AND DEFENDO 4-PIECE KIT.: Brand: ENDO CARRY-ON PROCEDURE KIT

## (undated) DEVICE — FORCEPS BX L240CM JAW DIA2.8MM L CAP W/ NDL MIC MESH TOOTH

## (undated) DEVICE — BAG BELONG PT PERS CLEAR HANDL

## (undated) DEVICE — SYRINGE MED 20ML STD CLR PLAS LUERLOCK TIP N CTRL DISP

## (undated) DEVICE — NEEDLE HYPO 18GA L1.5IN PNK S STL HUB POLYPR SHLD REG BVL

## (undated) DEVICE — 1200 GUARD II KIT W/5MM TUBE W/O VAC TUBE: Brand: GUARDIAN

## (undated) DEVICE — Device

## (undated) DEVICE — NEONATAL-ADULT SPO2 SENSOR: Brand: NELLCOR

## (undated) DEVICE — CONTAINER SPEC 20 ML LID NEUT BUFF FORMALIN 10 % POLYPR STS

## (undated) DEVICE — KIT IV STRT W CHLORAPREP PD 1ML

## (undated) DEVICE — SET ADMIN 16ML TBNG L100IN 2 Y INJ SITE IV PIGGY BK DISP